# Patient Record
Sex: FEMALE
[De-identification: names, ages, dates, MRNs, and addresses within clinical notes are randomized per-mention and may not be internally consistent; named-entity substitution may affect disease eponyms.]

---

## 2017-11-14 ENCOUNTER — CHARTING TRANS (OUTPATIENT)
Dept: OTHER | Age: 82
End: 2017-11-14

## 2018-02-10 ENCOUNTER — HOSPITAL ENCOUNTER (INPATIENT)
Facility: HOSPITAL | Age: 83
LOS: 3 days | Discharge: NURSING, REHAB OR LONG-TERM CARE | DRG: 554 | End: 2018-02-13
Attending: EMERGENCY MEDICINE | Admitting: HOSPITALIST
Payer: MEDICARE

## 2018-02-10 ENCOUNTER — APPOINTMENT (OUTPATIENT)
Dept: GENERAL RADIOLOGY | Facility: HOSPITAL | Age: 83
DRG: 554 | End: 2018-02-10
Attending: EMERGENCY MEDICINE
Payer: MEDICARE

## 2018-02-10 DIAGNOSIS — M25.00 HEMARTHROSIS: Primary | ICD-10-CM

## 2018-02-10 DIAGNOSIS — L03.113 CELLULITIS OF RIGHT UPPER EXTREMITY: ICD-10-CM

## 2018-02-10 DIAGNOSIS — R79.1 SUPRATHERAPEUTIC INR: ICD-10-CM

## 2018-02-10 LAB
ANION GAP SERPL CALC-SCNC: 8 MMOL/L (ref 0–18)
BASOPHILS # BLD: 0 K/UL (ref 0–0.2)
BASOPHILS NFR BLD: 0 %
BASOPHILS NFR FLD: 0 %
BUN SERPL-MCNC: 23 MG/DL (ref 8–20)
BUN/CREAT SERPL: 24.2 (ref 10–20)
CALCIUM SERPL-MCNC: 9.3 MG/DL (ref 8.5–10.5)
CHLORIDE SERPL-SCNC: 103 MMOL/L (ref 95–110)
CO2 SERPL-SCNC: 23 MMOL/L (ref 22–32)
CREAT SERPL-MCNC: 0.95 MG/DL (ref 0.5–1.5)
CRP SERPL-MCNC: 0.6 MG/DL (ref 0–0.9)
EOSINOPHIL # BLD: 0.1 K/UL (ref 0–0.7)
EOSINOPHIL NFR BLD: 1 %
EOSINOPHIL NFR FLD: 1 %
ERYTHROCYTE [DISTWIDTH] IN BLOOD BY AUTOMATED COUNT: 14.8 % (ref 11–15)
ERYTHROCYTE [SEDIMENTATION RATE] IN BLOOD: 29 MM/HR (ref 0–42)
GLUCOSE SERPL-MCNC: 129 MG/DL (ref 70–99)
HCT VFR BLD AUTO: 36.9 % (ref 35–48)
HGB BLD-MCNC: 12.3 G/DL (ref 12–16)
INR BLD: 3.9 (ref 0.9–1.2)
LYMPHOCYTES # BLD: 0.9 K/UL (ref 1–4)
LYMPHOCYTES NFR BLD: 11 %
LYMPHOCYTES NFR FLD: 6 %
MCH RBC QN AUTO: 31.7 PG (ref 27–32)
MCHC RBC AUTO-ENTMCNC: 33.3 G/DL (ref 32–37)
MCV RBC AUTO: 95 FL (ref 80–100)
MONOCYTES # BLD: 0.7 K/UL (ref 0–1)
MONOCYTES NFR BLD: 9 %
MONOCYTES NFR FLD: 5 %
NEUTROPHILS # BLD AUTO: 6.3 K/UL (ref 1.8–7.7)
NEUTROPHILS NFR BLD: 79 %
NEUTROPHILS NFR FLD: 87 %
OSMOLALITY UR CALC.SUM OF ELEC: 283 MOSM/KG (ref 275–295)
PLATELET # BLD AUTO: 194 K/UL (ref 140–400)
PMV BLD AUTO: 8.1 FL (ref 7.4–10.3)
POTASSIUM SERPL-SCNC: 4.1 MMOL/L (ref 3.3–5.1)
PROTHROMBIN TIME: 37.6 SECONDS (ref 11.8–14.5)
RBC # BLD AUTO: 3.88 M/UL (ref 3.7–5.4)
SODIUM SERPL-SCNC: 134 MMOL/L (ref 136–144)
TSH SERPL-ACNC: 0.49 UIU/ML (ref 0.45–5.33)
WBC # BLD AUTO: 8 K/UL (ref 4–11)
WBC # FLD: 5387 /CUMM
WBC OTHER NFR FLD: 1 %

## 2018-02-10 PROCEDURE — 85652 RBC SED RATE AUTOMATED: CPT | Performed by: EMERGENCY MEDICINE

## 2018-02-10 PROCEDURE — 85610 PROTHROMBIN TIME: CPT | Performed by: EMERGENCY MEDICINE

## 2018-02-10 PROCEDURE — 96366 THER/PROPH/DIAG IV INF ADDON: CPT

## 2018-02-10 PROCEDURE — 96376 TX/PRO/DX INJ SAME DRUG ADON: CPT

## 2018-02-10 PROCEDURE — 73080 X-RAY EXAM OF ELBOW: CPT | Performed by: EMERGENCY MEDICINE

## 2018-02-10 PROCEDURE — 96365 THER/PROPH/DIAG IV INF INIT: CPT

## 2018-02-10 PROCEDURE — 89050 BODY FLUID CELL COUNT: CPT | Performed by: EMERGENCY MEDICINE

## 2018-02-10 PROCEDURE — 89051 BODY FLUID CELL COUNT: CPT | Performed by: EMERGENCY MEDICINE

## 2018-02-10 PROCEDURE — 96375 TX/PRO/DX INJ NEW DRUG ADDON: CPT

## 2018-02-10 PROCEDURE — 99285 EMERGENCY DEPT VISIT HI MDM: CPT

## 2018-02-10 PROCEDURE — 20605 DRAIN/INJ JOINT/BURSA W/O US: CPT

## 2018-02-10 PROCEDURE — 85025 COMPLETE CBC W/AUTO DIFF WBC: CPT | Performed by: EMERGENCY MEDICINE

## 2018-02-10 PROCEDURE — A4216 STERILE WATER/SALINE, 10 ML: HCPCS | Performed by: HOSPITALIST

## 2018-02-10 PROCEDURE — 86140 C-REACTIVE PROTEIN: CPT | Performed by: EMERGENCY MEDICINE

## 2018-02-10 PROCEDURE — 87070 CULTURE OTHR SPECIMN AEROBIC: CPT | Performed by: EMERGENCY MEDICINE

## 2018-02-10 PROCEDURE — 84443 ASSAY THYROID STIM HORMONE: CPT | Performed by: HOSPITALIST

## 2018-02-10 PROCEDURE — 0R9L3ZX DRAINAGE OF RIGHT ELBOW JOINT, PERCUTANEOUS APPROACH, DIAGNOSTIC: ICD-10-PCS | Performed by: EMERGENCY MEDICINE

## 2018-02-10 PROCEDURE — 89060 EXAM SYNOVIAL FLUID CRYSTALS: CPT | Performed by: EMERGENCY MEDICINE

## 2018-02-10 PROCEDURE — 87205 SMEAR GRAM STAIN: CPT | Performed by: EMERGENCY MEDICINE

## 2018-02-10 PROCEDURE — 80048 BASIC METABOLIC PNL TOTAL CA: CPT | Performed by: EMERGENCY MEDICINE

## 2018-02-10 RX ORDER — SODIUM CHLORIDE 0.9 % (FLUSH) 0.9 %
3 SYRINGE (ML) INJECTION AS NEEDED
Status: DISCONTINUED | OUTPATIENT
Start: 2018-02-10 | End: 2018-02-13

## 2018-02-10 RX ORDER — MORPHINE SULFATE 2 MG/ML
2 INJECTION, SOLUTION INTRAMUSCULAR; INTRAVENOUS ONCE
Status: COMPLETED | OUTPATIENT
Start: 2018-02-10 | End: 2018-02-10

## 2018-02-10 RX ORDER — DOCUSATE SODIUM 100 MG/1
100 CAPSULE, LIQUID FILLED ORAL 2 TIMES DAILY
Status: DISCONTINUED | OUTPATIENT
Start: 2018-02-10 | End: 2018-02-13

## 2018-02-10 RX ORDER — LIDOCAINE HYDROCHLORIDE AND EPINEPHRINE 20; 5 MG/ML; UG/ML
20 INJECTION, SOLUTION EPIDURAL; INFILTRATION; INTRACAUDAL; PERINEURAL ONCE
Status: COMPLETED | OUTPATIENT
Start: 2018-02-10 | End: 2018-02-10

## 2018-02-10 RX ORDER — LIDOCAINE HYDROCHLORIDE 10 MG/ML
20 INJECTION, SOLUTION EPIDURAL; INFILTRATION; INTRACAUDAL; PERINEURAL ONCE
Status: DISCONTINUED | OUTPATIENT
Start: 2018-02-10 | End: 2018-02-10

## 2018-02-10 RX ORDER — HYDROCODONE BITARTRATE AND ACETAMINOPHEN 5; 325 MG/1; MG/1
2 TABLET ORAL EVERY 4 HOURS PRN
Status: DISCONTINUED | OUTPATIENT
Start: 2018-02-10 | End: 2018-02-13

## 2018-02-10 RX ORDER — TRAMADOL HYDROCHLORIDE 50 MG/1
50 TABLET ORAL EVERY 6 HOURS PRN
Status: DISCONTINUED | OUTPATIENT
Start: 2018-02-10 | End: 2018-02-13

## 2018-02-10 RX ORDER — SODIUM PHOSPHATE, DIBASIC AND SODIUM PHOSPHATE, MONOBASIC 7; 19 G/133ML; G/133ML
1 ENEMA RECTAL ONCE AS NEEDED
Status: DISCONTINUED | OUTPATIENT
Start: 2018-02-10 | End: 2018-02-13

## 2018-02-10 RX ORDER — ONDANSETRON 2 MG/ML
4 INJECTION INTRAMUSCULAR; INTRAVENOUS EVERY 6 HOURS PRN
Status: DISCONTINUED | OUTPATIENT
Start: 2018-02-10 | End: 2018-02-13

## 2018-02-10 RX ORDER — LEVOTHYROXINE SODIUM 0.15 MG/1
150 TABLET ORAL EVERY OTHER DAY
Status: DISCONTINUED | OUTPATIENT
Start: 2018-02-11 | End: 2018-02-13

## 2018-02-10 RX ORDER — ACETAMINOPHEN 325 MG/1
650 TABLET ORAL EVERY 4 HOURS PRN
Status: DISCONTINUED | OUTPATIENT
Start: 2018-02-10 | End: 2018-02-13

## 2018-02-10 RX ORDER — POLYETHYLENE GLYCOL 3350 17 G/17G
17 POWDER, FOR SOLUTION ORAL DAILY PRN
Status: DISCONTINUED | OUTPATIENT
Start: 2018-02-10 | End: 2018-02-13

## 2018-02-10 RX ORDER — LOSARTAN POTASSIUM 100 MG/1
100 TABLET ORAL DAILY
Status: DISCONTINUED | OUTPATIENT
Start: 2018-02-10 | End: 2018-02-11

## 2018-02-10 RX ORDER — ALENDRONATE SODIUM 70 MG/1
70 TABLET ORAL
Status: DISCONTINUED | OUTPATIENT
Start: 2018-02-11 | End: 2018-02-13

## 2018-02-10 RX ORDER — TRAZODONE HYDROCHLORIDE 100 MG/1
50 TABLET ORAL NIGHTLY PRN
Status: DISCONTINUED | OUTPATIENT
Start: 2018-02-10 | End: 2018-02-13

## 2018-02-10 RX ORDER — LIDOCAINE HYDROCHLORIDE AND EPINEPHRINE 20; 5 MG/ML; UG/ML
INJECTION, SOLUTION EPIDURAL; INFILTRATION; INTRACAUDAL; PERINEURAL
Status: DISPENSED
Start: 2018-02-10 | End: 2018-02-10

## 2018-02-10 RX ORDER — ROSUVASTATIN CALCIUM 20 MG/1
20 TABLET, COATED ORAL NIGHTLY
Status: DISCONTINUED | OUTPATIENT
Start: 2018-02-10 | End: 2018-02-13

## 2018-02-10 RX ORDER — MORPHINE SULFATE 2 MG/ML
INJECTION, SOLUTION INTRAMUSCULAR; INTRAVENOUS
Status: DISPENSED
Start: 2018-02-10 | End: 2018-02-10

## 2018-02-10 RX ORDER — METOPROLOL SUCCINATE 25 MG/1
25 TABLET, EXTENDED RELEASE ORAL 2 TIMES DAILY
Status: DISCONTINUED | OUTPATIENT
Start: 2018-02-10 | End: 2018-02-13

## 2018-02-10 RX ORDER — FUROSEMIDE 20 MG/1
20 TABLET ORAL DAILY
Status: DISCONTINUED | OUTPATIENT
Start: 2018-02-10 | End: 2018-02-12

## 2018-02-10 RX ORDER — BISACODYL 10 MG
10 SUPPOSITORY, RECTAL RECTAL
Status: DISCONTINUED | OUTPATIENT
Start: 2018-02-10 | End: 2018-02-13

## 2018-02-10 RX ORDER — MULTIVITAMIN/IRON/FOLIC ACID 18MG-0.4MG
100 TABLET ORAL DAILY
Status: DISCONTINUED | OUTPATIENT
Start: 2018-02-10 | End: 2018-02-13

## 2018-02-10 RX ORDER — AMLODIPINE BESYLATE 5 MG/1
5 TABLET ORAL DAILY
Status: DISCONTINUED | OUTPATIENT
Start: 2018-02-10 | End: 2018-02-13

## 2018-02-10 RX ORDER — LEVOTHYROXINE SODIUM 137 UG/1
137 TABLET ORAL EVERY OTHER DAY
Status: DISCONTINUED | OUTPATIENT
Start: 2018-02-12 | End: 2018-02-13

## 2018-02-10 RX ORDER — HYDROCODONE BITARTRATE AND ACETAMINOPHEN 5; 325 MG/1; MG/1
1 TABLET ORAL EVERY 4 HOURS PRN
Status: DISCONTINUED | OUTPATIENT
Start: 2018-02-10 | End: 2018-02-13

## 2018-02-10 NOTE — H&P
[address]    History & Physical (or consult)    Judy Half Patient Status:  Inpatient    1925 MRN [id number]   Location [facility] 3W/SW Attending Henny Pantoja MD   Hosp Day # 0 PCP ROE MOTLEY [facility] removed also  Family History   Problem Relation Age of Onset   • Heart Disorder Father    • Other [OTHER] Father      Kidney disease   • Heart Disorder Mother    • Diabetes Mother    • Hypertension Mother    • Heart Disorder Sister    • Cancer Brother Take 50 mg by mouth every 6 (six) hours as needed for Pain. Takes rarely   RESVERATROL OR Take  by mouth daily. Magnesium 100 MG Oral Cap Take 100 mg by mouth daily.      Calcium Carbonate-Vitamin D (OSCAL 500/200 D-3) 500-200 MG-UNIT Oral Tab Take 1 Tab 02/10/18   0650   RBC  3.88   HGB  12.3   HCT  36.9   MCV  95.0   MCH  31.7   MCHC  33.3   RDW  14.8   WBC  8.0   PLT  194       Recent Labs   Lab  02/10/18   0650   GLU  129*   BUN  23*   CREATSERUM  0.95   GFRAA  60   GFRNAA  52*   CA  9.3   NA  134*   K Keshia Uriarte MD  2/10/2018

## 2018-02-10 NOTE — ED PROVIDER NOTES
Patient Seen in: [facility] AND [facility] Emergency Department    History   Patient presents with:  Upper Extremity Injury (musculoskeletal)    Stated Complaint: r elbow pain can't remember injury    HPI    45-year-old female on Coumadin with history of A. fib (5' 4\")   Wt 70.3 kg   LMP 01/01/1987   SpO2 95%   BMI 26.61 kg/m²         Physical Exam    Constitutional: Oriented to person, place, and time. Appears well-developed. No distress. Head: Normocephalic and atraumatic.    Eyes: Conjunctivae are normal. P ------                     CBC W/ DIFFERENTIAL[[id number]]          Abnormal            Final result                 Please view results for these tests on the individual orders. CELL CT/DIF & CRYSTAL SYNOVIAL    Narrative:      The following orders were c No evidence of acute fracture or dislocation. No evidence of erosions. 2. Chronic fracture deformity of the proximal right radius. 3. Severe DJD of the right elbow.          MDM     Clinically after initially seeing the patient I did a quite a bit of concer

## 2018-02-10 NOTE — CM/SW NOTE
Call rc'd from pt's RN that pt is concerned her insurance is out of network. Explained to pt & son she is in-network and is an inpatient admission and this stay with be billed to Medicare Part A.  Pt & son v/u.

## 2018-02-10 NOTE — CONSULTS
NURSING INTAKE COMMENTS: Patient presents with:  Upper Extremity Injury (musculoskeletal)      HPI: This 80year old female presents today with complaints of right elbow pain. Patient denies any specific trauma.   She is on Coumadin and her labs, she is segovia Levothyroxine Sodium (SYNTHROID) 150 MCG Oral Tab Take 1 tablet (150 mcg total) by mouth every other day. Disp: 100 tablet Rfl: 1   Levothyroxine Sodium (SYNTHROID) 137 MCG Oral Tab Take 137 mcg by mouth every other day.  Disp: 100 tablet Rfl: 1   Theresa Gregg Years of education: N/A  Number of children: N/A     Occupational History  None on file     Social History Main Topics   Smoking status: Never Smoker    Smokeless tobacco: Never Used    Alcohol use No    Drug use: No    Sexual activity: Not on file     Oth

## 2018-02-10 NOTE — ED NOTES
Pt reports to ED with complaints of RUE pain that started last night. Pt denies any trauma or injury, no deformity. Pt tearful, reports that pain is worst in her R elbow but there is tenderness above and below the elbow.  Pt took tramadol at 2100 last night

## 2018-02-10 NOTE — PROGRESS NOTES
[facility] Pharmacy Note:  Renal Adjustment for cefazolin (ANCEF)    Nunu Lockett is a 80year old female who has been prescribed cefazolin (ANCEF) 1000 mg every 8 hrs. CrCl is estimated creatinine clearance is 32.6 mL/min (based on SCr of 0.95 mg/dL).  so the

## 2018-02-11 LAB
ANION GAP SERPL CALC-SCNC: 6 MMOL/L (ref 0–18)
BASOPHILS # BLD: 0.1 K/UL (ref 0–0.2)
BASOPHILS NFR BLD: 1 %
BUN SERPL-MCNC: 23 MG/DL (ref 8–20)
BUN/CREAT SERPL: 20 (ref 10–20)
CALCIUM SERPL-MCNC: 8.3 MG/DL (ref 8.5–10.5)
CHLORIDE SERPL-SCNC: 104 MMOL/L (ref 95–110)
CO2 SERPL-SCNC: 22 MMOL/L (ref 22–32)
CREAT SERPL-MCNC: 1.15 MG/DL (ref 0.5–1.5)
EOSINOPHIL # BLD: 0.1 K/UL (ref 0–0.7)
EOSINOPHIL NFR BLD: 2 %
ERYTHROCYTE [DISTWIDTH] IN BLOOD BY AUTOMATED COUNT: 14.7 % (ref 11–15)
GLUCOSE SERPL-MCNC: 110 MG/DL (ref 70–99)
HCT VFR BLD AUTO: 31.3 % (ref 35–48)
HGB BLD-MCNC: 10.6 G/DL (ref 12–16)
INR BLD: 2.3 (ref 0.9–1.2)
LYMPHOCYTES # BLD: 1.1 K/UL (ref 1–4)
LYMPHOCYTES NFR BLD: 16 %
MCH RBC QN AUTO: 32 PG (ref 27–32)
MCHC RBC AUTO-ENTMCNC: 33.8 G/DL (ref 32–37)
MCV RBC AUTO: 94.8 FL (ref 80–100)
MONOCYTES # BLD: 1 K/UL (ref 0–1)
MONOCYTES NFR BLD: 15 %
NEUTROPHILS # BLD AUTO: 4.4 K/UL (ref 1.8–7.7)
NEUTROPHILS NFR BLD: 66 %
OSMOLALITY UR CALC.SUM OF ELEC: 278 MOSM/KG (ref 275–295)
PLATELET # BLD AUTO: 148 K/UL (ref 140–400)
PMV BLD AUTO: 8 FL (ref 7.4–10.3)
POTASSIUM SERPL-SCNC: 4.1 MMOL/L (ref 3.3–5.1)
PROTHROMBIN TIME: 25.1 SECONDS (ref 11.8–14.5)
RBC # BLD AUTO: 3.3 M/UL (ref 3.7–5.4)
SODIUM SERPL-SCNC: 132 MMOL/L (ref 136–144)
WBC # BLD AUTO: 6.7 K/UL (ref 4–11)

## 2018-02-11 PROCEDURE — 85025 COMPLETE CBC W/AUTO DIFF WBC: CPT | Performed by: HOSPITALIST

## 2018-02-11 PROCEDURE — 85610 PROTHROMBIN TIME: CPT | Performed by: HOSPITALIST

## 2018-02-11 PROCEDURE — 80048 BASIC METABOLIC PNL TOTAL CA: CPT | Performed by: HOSPITALIST

## 2018-02-11 RX ORDER — HYDRALAZINE HYDROCHLORIDE 25 MG/1
25 TABLET, FILM COATED ORAL EVERY 8 HOURS PRN
Status: DISCONTINUED | OUTPATIENT
Start: 2018-02-11 | End: 2018-02-13

## 2018-02-11 RX ORDER — KETOROLAC TROMETHAMINE 30 MG/ML
15 INJECTION, SOLUTION INTRAMUSCULAR; INTRAVENOUS EVERY 6 HOURS
Status: DISPENSED | OUTPATIENT
Start: 2018-02-11 | End: 2018-02-13

## 2018-02-11 NOTE — PROGRESS NOTES
[facility]    Progress Note    Robertojossie Garza Patient Status:  Inpatient    1925 MRN [id number]   Location [facility] 3W/SW Attending Good Castaneda MD   Hosp Day # 1 PCP Sonia Singleton MD       Subjective:   Jessica Aguilera Severe DJD of the right elbow.                  GA Meyer  2/11/2018

## 2018-02-11 NOTE — PROGRESS NOTES
[address]    Progress Note    Kamilah Bad Patient Status:  Inpatient    1925 MRN [id number]   Location [facility] 3W/SW Attending Hugo Ordonez MD   Hosp Day # 1 PCP Ulysses Led, MD source Oral, resp.  rate 20, height 162.6 cm (5' 4\"), weight 162 lb 6.4 oz (73.7 kg), last menstrual period 01/01/1987, SpO2 94 %.    gen- NAD   heent- moist mucus membranes, atraumatic  Lungs-  clear bilaterally, normal respiratory effort  cv-  rrr,  no m tab 20 mg 20 mg Oral Nightly   TraMADol HCl (ULTRAM) tab 50 mg 50 mg Oral Q6H PRN   losartan (COZAAR) tab 100 mg 100 mg Oral Daily   ondansetron HCl (ZOFRAN) injection 4 mg 4 mg Intravenous Q6H PRN   ceFAZolin (ANCEF) IVPB 1g/100ml in 0.9% NaCl minibag/add

## 2018-02-12 LAB
ANION GAP SERPL CALC-SCNC: 8 MMOL/L (ref 0–18)
BUN SERPL-MCNC: 25 MG/DL (ref 8–20)
BUN/CREAT SERPL: 20.8 (ref 10–20)
CALCIUM SERPL-MCNC: 8.3 MG/DL (ref 8.5–10.5)
CHLORIDE SERPL-SCNC: 100 MMOL/L (ref 95–110)
CO2 SERPL-SCNC: 22 MMOL/L (ref 22–32)
CREAT SERPL-MCNC: 1.2 MG/DL (ref 0.5–1.5)
GLUCOSE SERPL-MCNC: 102 MG/DL (ref 70–99)
INR BLD: 1.6 (ref 0.9–1.2)
OSMOLALITY UR CALC.SUM OF ELEC: 275 MOSM/KG (ref 275–295)
POTASSIUM SERPL-SCNC: 4.1 MMOL/L (ref 3.3–5.1)
PROTHROMBIN TIME: 19 SECONDS (ref 11.8–14.5)
SODIUM SERPL-SCNC: 130 MMOL/L (ref 136–144)

## 2018-02-12 PROCEDURE — 97530 THERAPEUTIC ACTIVITIES: CPT

## 2018-02-12 PROCEDURE — 85610 PROTHROMBIN TIME: CPT | Performed by: HOSPITALIST

## 2018-02-12 PROCEDURE — 97162 PT EVAL MOD COMPLEX 30 MIN: CPT

## 2018-02-12 PROCEDURE — A4216 STERILE WATER/SALINE, 10 ML: HCPCS | Performed by: HOSPITALIST

## 2018-02-12 PROCEDURE — 80048 BASIC METABOLIC PNL TOTAL CA: CPT | Performed by: HOSPITALIST

## 2018-02-12 PROCEDURE — 97166 OT EVAL MOD COMPLEX 45 MIN: CPT

## 2018-02-12 RX ORDER — SODIUM CHLORIDE 9 MG/ML
INJECTION, SOLUTION INTRAVENOUS
Status: COMPLETED
Start: 2018-02-12 | End: 2018-02-12

## 2018-02-12 RX ORDER — WARFARIN SODIUM 2 MG/1
2 TABLET ORAL NIGHTLY
Status: DISCONTINUED | OUTPATIENT
Start: 2018-02-12 | End: 2018-02-13

## 2018-02-12 NOTE — OCCUPATIONAL THERAPY NOTE
OCCUPATIONAL THERAPY EVALUATION - INPATIENT     Room Number: 306/306-A  Evaluation Date: 2/12/2018  Type of Evaluation: Initial  Presenting Problem: supratherapeutic INR, RUE cellulitis    Physician Order: IP Consult to Occupational Therapy  Reason for The above deficits, maximizing patient's ability to return to prior level of function. Upon D/C, patient would benefit from continued OT in a rehab setting to return to PLOF and living alone at home safely.      DISCHARGE RECOMMENDATIONS  OT Discharge Recommend RW    Prior Level of Collin: Patient reports being mod I w/ ADLs, IADLs, including medication management and light cooking, functional mobility, and driving. Patient's son lives nearby and checks in on patient everyday.  Patient states that her family Modifier (G-Code): CK    FUNCTIONAL TRANSFER ASSESSMENT  Supine to Sit : Supervision  Sit to Stand: Minimum assistance  Toilet Transfer: min A  Shower Transfer: NT  Chair Transfer: min A x2    Bedroom Mobility: RW and min A/CGA    BALANCE ASSESSMENT  Stati

## 2018-02-12 NOTE — CM/SW NOTE
2/12/18 CM Discharge planning   Son in agreement with short term rehab, requested rehab at [facility] (sub acute), [facility] or [facility].    Referral and medical records faxed to above rehabs via [employer], also requested NH DON from [facility]

## 2018-02-12 NOTE — PHYSICAL THERAPY NOTE
PHYSICAL THERAPY EVALUATION - INPATIENT     Room Number: 306/306-A  Evaluation Date: 2/12/2018  Type of Evaluation: Initial   Physician Order: PT Eval and Treat    Presenting Problem: p/w R elbow pain, dx with hemarthrosis; negative for fx  Reason for The training;Transfer training;Balance training  Rehab Potential : Good  Frequency (Obs): 5x/week       PHYSICAL THERAPY MEDICAL/SOCIAL HISTORY     Problem List  Principal Problem:    Hemarthrosis  Active Problems:    Supratherapeutic INR    Cellulitis of righ mechanics;Repositioning    COGNITION  · Overall Cognitive Status:  WFL - within functional limits    RANGE OF MOTION AND STRENGTH ASSESSMENT    Lower extremity AROM is within functional limits    Lower extremity strength is within functional limits except within reach;RN aware of session/findings; All patient questions and concerns addressed    CURRENT GOALS    Goals to be met by: 2/26/18   Patient Goal Patient's self-stated goal is: Go to rehab and get stronger   Goal #1 Patient is able to demonstrate supin

## 2018-02-12 NOTE — PROGRESS NOTES
[facility] Hospitalist Team  Progress Note    Shyam Villavicencio Patient Status:  Inpatient    1925 MRN [id number]   Location [facility] 3W/SW Attending Ramona Ellis MD   Hosp Day # 2 PCP Carol Manriquez MD     CC: Follow Up  PCP: Wilfrido Simental am  -IVF,none  -diet-general    Prophy  -SCD  -warfarin    Dispo  -anticipate D/C to rehab tomorrow once arrangements made  Spoke to son Cornell Grijalva, [phone], wants pt to go to subacute [facility] if able, unclear on secondary options, he will be in later today to Medications:  ketorolac tromethamine (TORADOL) 30 MG/ML injection 15 mg 15 mg Intravenous Q6H   hydrALAzine HCl (APRESOLINE) tab 25 mg 25 mg Oral Q8H PRN   Normal Saline Flush 0.9 % injection 3 mL 3 mL Intravenous PRN   acetaminophen (TYLENOL) tab 650 mg 6 Still cannot use her right arm much.   She lives alone      o-  gen-nad,   Lungs-clear, no wheezing  cv-rrr no edema  abd-soft, nontender, nondistended, + bowel sounds  neuropsych - alert and oriented, mental status at baseline   msk- right elbow with brui

## 2018-02-12 NOTE — PROGRESS NOTES
Patient feeling significantly better with decreased sharp pain. On examination she still has pain with passive and active range of motion but much less so than when I last saw her. There still is a fluid collection/hematoma. No signs of infection.   No

## 2018-02-13 VITALS
HEIGHT: 64 IN | RESPIRATION RATE: 16 BRPM | SYSTOLIC BLOOD PRESSURE: 127 MMHG | WEIGHT: 165.13 LBS | DIASTOLIC BLOOD PRESSURE: 82 MMHG | BODY MASS INDEX: 28.19 KG/M2 | OXYGEN SATURATION: 96 % | HEART RATE: 75 BPM | TEMPERATURE: 98 F

## 2018-02-13 PROBLEM — L03.113 CELLULITIS OF RIGHT UPPER EXTREMITY: Status: RESOLVED | Noted: 2018-02-10 | Resolved: 2018-02-13

## 2018-02-13 PROBLEM — R79.1 SUPRATHERAPEUTIC INR: Status: RESOLVED | Noted: 2018-02-10 | Resolved: 2018-02-13

## 2018-02-13 LAB
ANION GAP SERPL CALC-SCNC: 6 MMOL/L (ref 0–18)
BUN SERPL-MCNC: 26 MG/DL (ref 8–20)
BUN/CREAT SERPL: 23.4 (ref 10–20)
CALCIUM SERPL-MCNC: 8.1 MG/DL (ref 8.5–10.5)
CHLORIDE SERPL-SCNC: 103 MMOL/L (ref 95–110)
CO2 SERPL-SCNC: 21 MMOL/L (ref 22–32)
CREAT SERPL-MCNC: 1.11 MG/DL (ref 0.5–1.5)
GLUCOSE SERPL-MCNC: 99 MG/DL (ref 70–99)
INR BLD: 1.5 (ref 0.9–1.2)
OSMOLALITY UR CALC.SUM OF ELEC: 275 MOSM/KG (ref 275–295)
POTASSIUM SERPL-SCNC: 4.3 MMOL/L (ref 3.3–5.1)
PROTHROMBIN TIME: 17.6 SECONDS (ref 11.8–14.5)
SODIUM SERPL-SCNC: 130 MMOL/L (ref 136–144)

## 2018-02-13 PROCEDURE — 97116 GAIT TRAINING THERAPY: CPT

## 2018-02-13 PROCEDURE — 80048 BASIC METABOLIC PNL TOTAL CA: CPT | Performed by: NURSE PRACTITIONER

## 2018-02-13 PROCEDURE — 85610 PROTHROMBIN TIME: CPT | Performed by: NURSE PRACTITIONER

## 2018-02-13 PROCEDURE — 97530 THERAPEUTIC ACTIVITIES: CPT

## 2018-02-13 RX ORDER — PSEUDOEPHEDRINE HCL 30 MG
100 TABLET ORAL 2 TIMES DAILY
Qty: 60 CAPSULE | Refills: 0 | Status: SHIPPED | OUTPATIENT
Start: 2018-02-13 | End: 2018-08-02 | Stop reason: ALTCHOICE

## 2018-02-13 RX ORDER — TRAMADOL HYDROCHLORIDE 50 MG/1
50 TABLET ORAL EVERY 12 HOURS PRN
Status: DISCONTINUED | OUTPATIENT
Start: 2018-02-13 | End: 2018-02-13

## 2018-02-13 RX ORDER — TRAMADOL HYDROCHLORIDE 50 MG/1
50 TABLET ORAL EVERY 6 HOURS PRN
Qty: 10 TABLET | Refills: 0 | Status: ON HOLD | OUTPATIENT
Start: 2018-02-13 | End: 2021-01-04

## 2018-02-13 RX ORDER — FUROSEMIDE 20 MG/1
20 TABLET ORAL DAILY
Qty: 100 TABLET | Refills: 1 | Status: SHIPPED | OUTPATIENT
Start: 2018-02-13 | End: 2018-08-02

## 2018-02-13 RX ORDER — VALSARTAN 160 MG/1
TABLET ORAL
Qty: 30 TABLET | Refills: 0 | Status: SHIPPED | OUTPATIENT
Start: 2018-02-13 | End: 2018-08-02

## 2018-02-13 RX ORDER — HYDROCODONE BITARTRATE AND ACETAMINOPHEN 5; 325 MG/1; MG/1
TABLET ORAL
Qty: 20 TABLET | Refills: 0 | Status: SHIPPED | OUTPATIENT
Start: 2018-02-13 | End: 2019-02-12

## 2018-02-13 RX ORDER — WARFARIN SODIUM 2 MG/1
2 TABLET ORAL NIGHTLY
Qty: 30 TABLET | Refills: 0 | Status: SHIPPED | OUTPATIENT
Start: 2018-02-13 | End: 2018-08-03

## 2018-02-13 RX ORDER — FUROSEMIDE 20 MG/1
20 TABLET ORAL
Qty: 100 TABLET | Refills: 1 | Status: SHIPPED | OUTPATIENT
Start: 2018-02-13 | End: 2018-02-13

## 2018-02-13 RX ORDER — POLYETHYLENE GLYCOL 3350 17 G/17G
17 POWDER, FOR SOLUTION ORAL DAILY PRN
Qty: 7 EACH | Refills: 0 | Status: SHIPPED | OUTPATIENT
Start: 2018-02-13 | End: 2018-08-02 | Stop reason: ALTCHOICE

## 2018-02-13 NOTE — PROGRESS NOTES
[facility] Pharmacy Note:  Renal Dose Adjustment for Tramadol Malathi Vergara)    Neela Dao has been prescribed Tramadol (ULTRAM) 50 mg orally every 6 hours as needed for pain. Estimated Creatinine Clearance: 27.9 mL/min (based on SCr of 1.11 mg/dL).     Her calcu

## 2018-02-13 NOTE — DISCHARGE SUMMARY
[facility] Hospitalist Discharge Summary   Patient ID:  Hannah Syed  [id number]  17 year old  12/30/1925    Admit date: 2/10/2018  Discharge date: 2/13/2018    Primary Care Physician: Latisha Steiner MD   Attending Physician: Carri Villagomez MD   [facility] Erythema. Pt found to have hemarthrosis right elbow in setting of admission INR 3.9. Will need tighter control of INR upon D/C.  Pt D/C to rehab, see below for details     Hemarthrosis, Right Elbow  -admit INR 3.9  -Xray right elbow- no evidence of fx  -S/P medications    docusate sodium 100 MG Caps  Commonly known as:  COLACE  Take 100 mg by mouth 2 (two) times daily.      HYDROcodone-acetaminophen 5-325 MG Tabs  Commonly known as:  NORCO  1-2 tabs every 4-6 hours as needed     magnesium hydroxide 400 MG/5ML TOPROL XL  Take 1 tablet (25 mg total) by mouth 2 (two) times daily.      MULTIVITAMIN OR     OSCAL 500/200 D-3 500-200 MG-UNIT Tabs  Generic drug:  Calcium Carbonate-Vitamin D     RESVERATROL OR     TraMADol HCl 50 MG Tabs  Commonly known as:  ULTRAM  Take agree with therapeutic plan as outlined    Hawk Kraus RN, NP   [facility] Hospitalist Team  Pager [phone]  Answering Service number: [phone]  2/13/2018      SEE ATTENDING NOTE BELOW        Patient seen and examined independently.   Discussed with GA singh

## 2018-02-13 NOTE — PHYSICAL THERAPY NOTE
PHYSICAL THERAPY TREATMENT NOTE - INPATIENT    Room Number: 347/231-D       Presenting Problem: p/w R elbow pain, dx with hemarthrosis; negative for fx    Problem List  Principal Problem:    Hemarthrosis      ASSESSMENT   RN approved session, reports plan have...  -   Turning over in bed (including adjusting bedclothes, sheets and blankets)?: A Little   -   Sitting down on and standing up from a chair with arms (e.g., wheelchair, bedside commode, etc.): A Little   -   Moving from lying on back to sitting on down. Better able to wb through RUE today per patient report. Goal #4 Patient will negotiate 2 stairs/one curb w/ assistive device and supervision   Goal #4   Current Status NT- going to rehab so will not stair train at this time.     Goal #5 Patient to

## 2018-02-13 NOTE — CONSULTS
Called [facility] for report, awaiting son to tranport patient, rx sent. Instructions given.

## 2018-02-13 NOTE — CM/SW NOTE
2/13/18  Discharge planning   Spoke with son Portillo Diaz via phone, advised that [facility] has accepted pt and bed available today at 4:30 pm, room 311 - A, RN report [phone]. Son agreed to above rehab transfer and will transport pt ot rehab.

## 2018-11-02 VITALS — TEMPERATURE: 97.8 F

## 2020-01-13 ENCOUNTER — OFFICE VISIT (OUTPATIENT)
Dept: SURGERY | Facility: CLINIC | Age: 85
End: 2020-01-13
Payer: COMMERCIAL

## 2020-01-13 ENCOUNTER — TELEPHONE (OUTPATIENT)
Dept: SURGERY | Facility: CLINIC | Age: 85
End: 2020-01-13

## 2020-01-13 VITALS
TEMPERATURE: 98 F | HEART RATE: 88 BPM | SYSTOLIC BLOOD PRESSURE: 159 MMHG | WEIGHT: 165 LBS | DIASTOLIC BLOOD PRESSURE: 76 MMHG | BODY MASS INDEX: 28.17 KG/M2 | HEIGHT: 64 IN

## 2020-01-13 DIAGNOSIS — N13.5 UPJ OBSTRUCTION, ACQUIRED: Primary | ICD-10-CM

## 2020-01-13 PROCEDURE — 99203 OFFICE O/P NEW LOW 30 MIN: CPT | Performed by: UROLOGY

## 2020-01-13 PROCEDURE — G0463 HOSPITAL OUTPT CLINIC VISIT: HCPCS | Performed by: UROLOGY

## 2020-01-13 NOTE — TELEPHONE ENCOUNTER
Received fax confirmation. Waiting for records to be faxed. Received op note from 12/24/2018 Dr. Andreia Blount. Matias at [employer]. One copy placed on Dr Macrina Givens desk, other copy sent to scanning.

## 2020-01-13 NOTE — PROGRESS NOTES
SUBJECTIVE:  Tiff Willams is a 80year old female who presents for a consultation at the request of, and a copy of this note will be sent to, Dr. 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 She states that the problem is unchanged.  Sympto Relation Age of Onset   • Heart Disorder Father    • Other (Other) Father         Kidney disease   • Heart Disorder Mother    • Diabetes Mother    • Hypertension Mother    • Heart Disorder Sister    • Cancer Brother         colon   • Cancer Brother If she develops pain, worsening renal function or episodes of UTI, can replace the stent at that time. She is here with her son who provides some of her history.         Received some records from [facility].  Operative note dated Decem

## 2020-01-13 NOTE — TELEPHONE ENCOUNTER
Per Dr. Deidra Spear, faxed JANIA form to Dr. Alma Patricia at [facility] UBD#[phone]. Waiting for fax confirmation. Placed in fax confirmation folder.

## 2020-03-02 ENCOUNTER — TELEPHONE (OUTPATIENT)
Dept: SURGERY | Facility: CLINIC | Age: 85
End: 2020-03-02

## 2020-03-02 NOTE — TELEPHONE ENCOUNTER
Patient scheduled for in office cystoscopy and stent removal on 3/10. Patient reports she has had bilateral shoulder, hip and knee replacements and usually takes abx prior to dental work. She is also on coumadin.   Please advise if coumadin needs to be he

## 2020-03-03 NOTE — TELEPHONE ENCOUNTER
Nurse may given her Cipro 500 mg when she gets to the office rather than after. This should suffice. She can continue on coumadin as well.   Thanks

## 2020-03-03 NOTE — TELEPHONE ENCOUNTER
Patient now in assisted living and no longer under my care. I believe she is being followed by a physician who goes to the facility.

## 2020-03-04 NOTE — TELEPHONE ENCOUNTER
Instructions below reviewed and patient notified with understanding. Appointment notes updated to reflect patient to receive cipro pre procedure.

## 2020-03-06 ENCOUNTER — TELEPHONE (OUTPATIENT)
Dept: SURGERY | Facility: CLINIC | Age: 85
End: 2020-03-06

## 2020-03-06 NOTE — TELEPHONE ENCOUNTER
I called pt's [facility] ins plan and s/w rep. Jack Grier and gave ins info md info codes and clinical info and she informed that PA was not required for this in office procedure.

## 2020-03-06 NOTE — TELEPHONE ENCOUNTER
[facility] from [facility] advantage plan calling states pt may need PA for the procedure on 3/10 @ 3pm . Also asked for the CPT code as well please advise

## 2020-03-10 ENCOUNTER — TELEPHONE (OUTPATIENT)
Dept: SURGERY | Facility: CLINIC | Age: 85
End: 2020-03-10

## 2020-03-10 ENCOUNTER — PROCEDURE (OUTPATIENT)
Dept: SURGERY | Facility: CLINIC | Age: 85
End: 2020-03-10
Payer: COMMERCIAL

## 2020-03-10 VITALS
BODY MASS INDEX: 28 KG/M2 | SYSTOLIC BLOOD PRESSURE: 138 MMHG | HEART RATE: 82 BPM | WEIGHT: 165 LBS | DIASTOLIC BLOOD PRESSURE: 80 MMHG

## 2020-03-10 DIAGNOSIS — N13.5 OBSTRUCTION OF RIGHT URETEROPELVIC JUNCTION (UPJ): Primary | ICD-10-CM

## 2020-03-10 PROCEDURE — 52000 CYSTOURETHROSCOPY: CPT | Performed by: UROLOGY

## 2020-03-10 PROCEDURE — 3079F DIAST BP 80-89 MM HG: CPT | Performed by: UROLOGY

## 2020-03-10 PROCEDURE — 3075F SYST BP GE 130 - 139MM HG: CPT | Performed by: UROLOGY

## 2020-03-10 RX ORDER — CIPROFLOXACIN 500 MG/1
500 TABLET, FILM COATED ORAL ONCE
Status: COMPLETED | OUTPATIENT
Start: 2020-03-10 | End: 2020-03-10

## 2020-03-10 RX ADMIN — CIPROFLOXACIN 500 MG: 500 TABLET, FILM COATED ORAL at 16:36:00

## 2020-03-10 NOTE — TELEPHONE ENCOUNTER
moni from [facility] [place] called. Pt is scheduled today 3-10-20 at 3:00pm for cysto. Pt is taking coumadin. Call transferred to the nurse.

## 2020-03-10 NOTE — PROGRESS NOTES
Stefania Last is a 80year old female.     HPI:   Patient presents with:  Kidney Problem: patient presents for cysto,right stent removal       80-year-old female presents for cystoscopy and right ureteral stent removal.  She has a chronic history of right Mother    • Heart Disorder Sister    • Cancer Brother         colon   • Cancer Brother         Leukemia   • Heart Disorder Brother       Social History: Social History    Tobacco Use      Smoking status: Never Smoker      Smokeless tobacco: Never Used    A Multiple Vitamins-Minerals (MULTIVITAMIN OR) Take 1 tablet by mouth daily. Allergies:  No Known Allergies      ROS:       PHYSICAL EXAM:   Neela Dao  : 1925  Referring Physician: No ref.  provider found     Patient presents with:  Lissa Monaco

## 2020-03-31 ENCOUNTER — TELEPHONE (OUTPATIENT)
Dept: SURGERY | Facility: CLINIC | Age: 85
End: 2020-03-31

## 2020-03-31 NOTE — TELEPHONE ENCOUNTER
If pain is mild to moderate is probably okay just to manage the pain for the time being.   If it is possible to get the kidney ultrasound in the facility that would be reasonable although it would also be okay to wait for 3 to 4 weeks until the current patel

## 2020-03-31 NOTE — TELEPHONE ENCOUNTER
S/W pt and informed her of ELIU's response msg as stated below and pt verbalized understanding but stated that she is in a nursing home and cant go out and does not know if the US can be done there.  I told pt that I will call the home and s/w the nurse madhav

## 2020-03-31 NOTE — TELEPHONE ENCOUNTER
Pt called stating pt had cysto and stent removal on 3-10-20. Pt is having right kidney pain.   Call pt to advise

## 2020-03-31 NOTE — TELEPHONE ENCOUNTER
Patient c/o right flank pain, describes it as dull, achy, moving makes it worse; onset 2 days ago. Patient states she is urinating \"ok\". Denies fever, chills. Patient states hx of right kidney pain, managed with a stent.  Patient states ureteral emanuel

## 2020-04-02 ENCOUNTER — TELEPHONE (OUTPATIENT)
Dept: SURGERY | Facility: CLINIC | Age: 85
End: 2020-04-02

## 2020-04-02 NOTE — TELEPHONE ENCOUNTER
Received FAX from [employer]. FOR ultrasound report and order signed by MD. Kendy Sharp on MD's desk to review and sign.

## 2020-04-07 NOTE — TELEPHONE ENCOUNTER
Call pt. Inform her I have reviewed her renal US.   Unfortunately the right cannot be visualized well, likely a technical issue related to way US was done and also that her right kidney is likely atrophic  Find out if she is still having right sided flank

## 2020-04-08 NOTE — TELEPHONE ENCOUNTER
S/W pt's son and informed him of the results and orders in [facility] as stated below and he stated that he thinks her pain is better but not positive. I told him that I will try reaching pt again to make sure and let KHVINNY know.

## 2020-04-08 NOTE — TELEPHONE ENCOUNTER
I called pt again and determined that she does have what she calls moderate pain at level 5-6 now in her Rt flank area. Denies any UTI symptoms or fevr and chills.  I told pt that I will let ELIU know this and I informed her of his instructions as stated bel

## 2020-04-09 NOTE — TELEPHONE ENCOUNTER
Received some ua and c&s results from [facility] lab that were ordered by a Dr. Madelin Holder. The culture shows only contaminant bacteria so I will send them to scanning.

## 2020-12-27 ENCOUNTER — HOSPITAL ENCOUNTER (INPATIENT)
Facility: HOSPITAL | Age: 85
LOS: 9 days | Discharge: NURSING, REHAB OR LONG-TERM CARE | DRG: 603 | End: 2021-01-05
Attending: EMERGENCY MEDICINE | Admitting: EMERGENCY MEDICINE
Payer: MEDICARE

## 2020-12-27 DIAGNOSIS — L03.116 CELLULITIS AND ABSCESS OF LEFT LEG: Primary | ICD-10-CM

## 2020-12-27 DIAGNOSIS — L02.416 CELLULITIS AND ABSCESS OF LEFT LEG: Primary | ICD-10-CM

## 2020-12-27 PROBLEM — R79.89 AZOTEMIA: Status: ACTIVE | Noted: 2020-12-27

## 2020-12-27 PROBLEM — E87.1 HYPONATREMIA: Status: ACTIVE | Noted: 2020-12-27

## 2020-12-27 PROBLEM — D64.9 ANEMIA: Status: ACTIVE | Noted: 2020-12-27

## 2020-12-27 LAB
ANION GAP SERPL CALC-SCNC: 6 MMOL/L (ref 0–18)
APTT PPP: 84 SECONDS (ref 23.2–35.3)
BASOPHILS # BLD AUTO: 0.04 X10(3) UL (ref 0–0.2)
BASOPHILS NFR BLD AUTO: 0.4 %
BUN BLD-MCNC: 29 MG/DL (ref 7–18)
BUN/CREAT SERPL: 24.6 (ref 10–20)
CALCIUM BLD-MCNC: 9.3 MG/DL (ref 8.5–10.1)
CHLORIDE SERPL-SCNC: 97 MMOL/L (ref 98–112)
CO2 SERPL-SCNC: 28 MMOL/L (ref 21–32)
CREAT BLD-MCNC: 1.18 MG/DL
DEPRECATED RDW RBC AUTO: 55.8 FL (ref 35.1–46.3)
EOSINOPHIL # BLD AUTO: 0.22 X10(3) UL (ref 0–0.7)
EOSINOPHIL NFR BLD AUTO: 2 %
ERYTHROCYTE [DISTWIDTH] IN BLOOD BY AUTOMATED COUNT: 18.3 % (ref 11–15)
GLUCOSE BLD-MCNC: 98 MG/DL (ref 70–99)
HCT VFR BLD AUTO: 30.9 %
HGB BLD-MCNC: 10 G/DL
IMM GRANULOCYTES # BLD AUTO: 0.05 X10(3) UL (ref 0–1)
IMM GRANULOCYTES NFR BLD: 0.4 %
INR BLD: 3.78 (ref 0.9–1.2)
LACTATE SERPL-SCNC: 1.1 MMOL/L (ref 0.4–2)
LYMPHOCYTES # BLD AUTO: 0.71 X10(3) UL (ref 1–4)
LYMPHOCYTES NFR BLD AUTO: 6.3 %
MCH RBC QN AUTO: 27.4 PG (ref 26–34)
MCHC RBC AUTO-ENTMCNC: 32.4 G/DL (ref 31–37)
MCV RBC AUTO: 84.7 FL
MONOCYTES # BLD AUTO: 0.79 X10(3) UL (ref 0.1–1)
MONOCYTES NFR BLD AUTO: 7 %
NEUTROPHILS # BLD AUTO: 9.4 X10 (3) UL (ref 1.5–7.7)
NEUTROPHILS # BLD AUTO: 9.4 X10(3) UL (ref 1.5–7.7)
NEUTROPHILS NFR BLD AUTO: 83.9 %
OSMOLALITY SERPL CALC.SUM OF ELEC: 278 MOSM/KG (ref 275–295)
PLATELET # BLD AUTO: 401 10(3)UL (ref 150–450)
POTASSIUM SERPL-SCNC: 4.7 MMOL/L (ref 3.5–5.1)
PROTHROMBIN TIME: 36.7 SECONDS (ref 11.8–14.5)
RBC # BLD AUTO: 3.65 X10(6)UL
SARS-COV-2 RNA RESP QL NAA+PROBE: NOT DETECTED
SODIUM SERPL-SCNC: 131 MMOL/L (ref 136–145)
WBC # BLD AUTO: 11.2 X10(3) UL (ref 4–11)

## 2020-12-27 PROCEDURE — 83605 ASSAY OF LACTIC ACID: CPT | Performed by: EMERGENCY MEDICINE

## 2020-12-27 PROCEDURE — 87040 BLOOD CULTURE FOR BACTERIA: CPT | Performed by: EMERGENCY MEDICINE

## 2020-12-27 PROCEDURE — 36415 COLL VENOUS BLD VENIPUNCTURE: CPT

## 2020-12-27 PROCEDURE — 85730 THROMBOPLASTIN TIME PARTIAL: CPT | Performed by: EMERGENCY MEDICINE

## 2020-12-27 PROCEDURE — 80048 BASIC METABOLIC PNL TOTAL CA: CPT | Performed by: EMERGENCY MEDICINE

## 2020-12-27 PROCEDURE — 99285 EMERGENCY DEPT VISIT HI MDM: CPT

## 2020-12-27 PROCEDURE — 96367 TX/PROPH/DG ADDL SEQ IV INF: CPT

## 2020-12-27 PROCEDURE — 85025 COMPLETE CBC W/AUTO DIFF WBC: CPT | Performed by: EMERGENCY MEDICINE

## 2020-12-27 PROCEDURE — 87070 CULTURE OTHR SPECIMN AEROBIC: CPT | Performed by: EMERGENCY MEDICINE

## 2020-12-27 PROCEDURE — 85610 PROTHROMBIN TIME: CPT | Performed by: EMERGENCY MEDICINE

## 2020-12-27 PROCEDURE — 96365 THER/PROPH/DIAG IV INF INIT: CPT

## 2020-12-27 PROCEDURE — 87205 SMEAR GRAM STAIN: CPT | Performed by: EMERGENCY MEDICINE

## 2020-12-27 RX ORDER — LEVOTHYROXINE SODIUM 0.2 MG/1
175 TABLET ORAL
COMMUNITY

## 2020-12-27 RX ORDER — VANCOMYCIN HYDROCHLORIDE
15
Status: DISCONTINUED | OUTPATIENT
Start: 2020-12-29 | End: 2020-12-29

## 2020-12-27 RX ORDER — CEPHALEXIN 500 MG/1
500 CAPSULE ORAL 3 TIMES DAILY
Status: ON HOLD | COMMUNITY
Start: 2020-12-24 | End: 2021-01-04

## 2020-12-27 RX ORDER — ALUMINA, MAGNESIA, AND SIMETHICONE 2400; 2400; 240 MG/30ML; MG/30ML; MG/30ML
10 SUSPENSION ORAL EVERY 6 HOURS PRN
COMMUNITY

## 2020-12-27 RX ORDER — ONDANSETRON 4 MG/1
4 TABLET, FILM COATED ORAL EVERY 6 HOURS PRN
COMMUNITY

## 2020-12-27 RX ORDER — VANCOMYCIN HYDROCHLORIDE
25 ONCE
Status: COMPLETED | OUTPATIENT
Start: 2020-12-27 | End: 2020-12-28

## 2020-12-28 ENCOUNTER — APPOINTMENT (OUTPATIENT)
Dept: CT IMAGING | Facility: HOSPITAL | Age: 85
DRG: 603 | End: 2020-12-28
Attending: INTERNAL MEDICINE
Payer: MEDICARE

## 2020-12-28 LAB
ANION GAP SERPL CALC-SCNC: 5 MMOL/L (ref 0–18)
BUN BLD-MCNC: 25 MG/DL (ref 7–18)
BUN/CREAT SERPL: 20.8 (ref 10–20)
CALCIUM BLD-MCNC: 9 MG/DL (ref 8.5–10.1)
CHLORIDE SERPL-SCNC: 101 MMOL/L (ref 98–112)
CO2 SERPL-SCNC: 27 MMOL/L (ref 21–32)
CREAT BLD-MCNC: 1.2 MG/DL
DEPRECATED RDW RBC AUTO: 54.9 FL (ref 35.1–46.3)
ERYTHROCYTE [DISTWIDTH] IN BLOOD BY AUTOMATED COUNT: 18.1 % (ref 11–15)
GLUCOSE BLD-MCNC: 86 MG/DL (ref 70–99)
HCT VFR BLD AUTO: 29.1 %
HGB BLD-MCNC: 9.2 G/DL
INR BLD: 3.17 (ref 0.9–1.2)
MCH RBC QN AUTO: 26.6 PG (ref 26–34)
MCHC RBC AUTO-ENTMCNC: 31.6 G/DL (ref 31–37)
MCV RBC AUTO: 84.1 FL
OSMOLALITY SERPL CALC.SUM OF ELEC: 280 MOSM/KG (ref 275–295)
PLATELET # BLD AUTO: 376 10(3)UL (ref 150–450)
POTASSIUM SERPL-SCNC: 4.4 MMOL/L (ref 3.5–5.1)
PROCALCITONIN SERPL-MCNC: 0.47 NG/ML (ref ?–0.16)
PROTHROMBIN TIME: 32 SECONDS (ref 11.8–14.5)
RBC # BLD AUTO: 3.46 X10(6)UL
SODIUM SERPL-SCNC: 133 MMOL/L (ref 136–145)
WBC # BLD AUTO: 9.4 X10(3) UL (ref 4–11)

## 2020-12-28 PROCEDURE — 85027 COMPLETE CBC AUTOMATED: CPT | Performed by: HOSPITALIST

## 2020-12-28 PROCEDURE — 73700 CT LOWER EXTREMITY W/O DYE: CPT | Performed by: INTERNAL MEDICINE

## 2020-12-28 PROCEDURE — 84145 PROCALCITONIN (PCT): CPT | Performed by: INTERNAL MEDICINE

## 2020-12-28 PROCEDURE — 85610 PROTHROMBIN TIME: CPT | Performed by: HOSPITALIST

## 2020-12-28 PROCEDURE — 87641 MR-STAPH DNA AMP PROBE: CPT | Performed by: INTERNAL MEDICINE

## 2020-12-28 PROCEDURE — 80048 BASIC METABOLIC PNL TOTAL CA: CPT | Performed by: HOSPITALIST

## 2020-12-28 RX ORDER — ACETAMINOPHEN 325 MG/1
650 TABLET ORAL EVERY 6 HOURS PRN
Status: DISCONTINUED | OUTPATIENT
Start: 2020-12-28 | End: 2021-01-02

## 2020-12-28 RX ORDER — LEVOTHYROXINE SODIUM 0.1 MG/1
200 TABLET ORAL
Status: DISCONTINUED | OUTPATIENT
Start: 2020-12-28 | End: 2021-01-05

## 2020-12-28 NOTE — PROGRESS NOTES
[facility] Dosing Service    Initial Pharmacokinetic Consult for Vancomycin Dosing     Ishaan Ewing is a 80year old patient who is being treated for cellulitis and abscess .   Pharmacy has been asked to dose Vancomycin by Dr. Pallavi Novak    Allergies:  Co

## 2020-12-28 NOTE — PLAN OF CARE
Patient sleeping comfortably in bed. Denies pain this shift except during dressing change. Call light within reach. Bed low and locked. Coumadin for DVT ppx. NPO maintained except sips with meds. Vanco and zosyn.  Ambulating by stand and pivot to the tristan

## 2020-12-28 NOTE — ED NOTES
Orders for admission, patient is aware of plan and ready to go upstairs. Any questions, please call ED JARROD Ramos  at extension [phone].    Type of COVID test sent:Rapid  COVID Suspicion level: Negative    Titratable drug(s) infusing:Vancomycin 1.5g  Rate:167

## 2020-12-28 NOTE — ED PROVIDER NOTES
Patient Seen in: [facility] AND [facility] Emergency Department      History   Patient presents with:  Cellulitis    Stated Complaint: rle cellulitis    HPI    Patient presents to the emergency department with right lower leg swelling redness and cellulitis.   Fo above.    Physical Exam     ED Triage Vitals [12/27/20 2109]   BP (!) 163/94   Pulse 96   Resp 18   Temp 98.4 °F (36.9 °C)   Temp src Oral   SpO2 96 %   O2 Device None (Room air)       Current:/81   Pulse 70   Temp 98.4 °F (36.9 °C) (Oral)   Resp 18 components within normal limits   CBC W/ DIFFERENTIAL - Abnormal; Notable for the following components:    WBC 11.2 (*)     RBC 3.65 (*)     HGB 10.0 (*)     HCT 30.9 (*)     RDW-SD 55.8 (*)     RDW 18.3 (*)     Neutrophil Absolute Prelim 9.40 (*)     Neut 12/27/2020 Unknown    Hyponatremia E87.1 12/27/2020 Yes

## 2020-12-28 NOTE — ED INITIAL ASSESSMENT (HPI)
Pt to ed via ems for rle swelling Per ems, pt had an abscess on her r hip and now the c/o rle swelling. Per NH RN report pta, pt was started on keflex for infection.

## 2020-12-28 NOTE — ED PROVIDER NOTES
Patient signed out to me by Dr. Marlon Ramirez. Patient INR is 3.78 and does not require any pulmonary embolism or DVT testing at this time. Discussed with orthopedic who states he will see the patient in the morning.

## 2020-12-28 NOTE — H&P
[facility] Hospitalist H&P       CC: Right buttock abscess       PCP: Ever Alonzo MD    History of Present Illness:   Mrs. Paola Bianchi is a 80year old female with a history of Atrial fibrillation on coumadin, PE, Stroke, hypothyroidism, Hypertension, who prese breakfast., Disp: , Rfl:     •  Alum & Mag Hydroxide-Simeth 153-932-27 MG/5ML Oral Suspension, Take 10 mL by mouth 4 (four) times daily as needed for Indigestion. , Disp: , Rfl:     •  Ondansetron HCl (ZOFRAN) 4 mg tablet, Take 4 mg by mouth every 6 (six) h Brother          OBJECTIVE:  /82 (BP Location: Right arm)   Pulse 73   Temp 98.4 °F (36.9 °C) (Oral)   Resp 17   Ht 5' 4\" (1.626 m)   Wt 171 lb 9.6 oz (77.8 kg)   LMP 01/01/1987   SpO2 98%   BMI 29.46 kg/m²   General: Alert, no acute distress  Lungs

## 2020-12-28 NOTE — CONSULTS
[facility] AND [facility] Infectious Disease  Report of Consultation    Va Ybarra Patient Status:  Inpatient    1925 MRN [id number]   [facility] 4W/SW/SE Attending Maikel Saavedra, [address] [facility] Day # 1 PCP Madelin Holder MD     Lb Disorder Mother    • Diabetes Mother    • Hypertension Mother    • Heart Disorder Sister    • Cancer Brother         colon   • Cancer Brother         Leukemia   • Heart Disorder Brother       reports that she has never smoked.  She has never used smokeless hrs:   BP Temp Temp src Pulse Resp SpO2 Height Weight   12/28/20 1055 155/76 98.4 °F (36.9 °C) Oral 80 — 97 % — —   12/28/20 0559 138/82 98.4 °F (36.9 °C) Oral 73 17 98 % — —   12/27/20 2334 (!) 166/86 98.1 °F (36.7 °C) Oral 74 18 97 % — 171 lb 9.6 oz (77. screen  - If there is a possibility of hardware involvement this may become a surgical issue which could be difficult in this elderly patient  - Cultures pending, negative thus far  - IV vancomycin ongoing    2.   Low grade leukocytosis due to the above  -

## 2020-12-28 NOTE — PROGRESS NOTES
[facility] Dosing Service    Initial Pharmacokinetic Consult for Vancomycin Dosing     Eliecer Schofield is a 80year old patient who is being treated for cellulitis.   Pharmacy has been asked to dose Vancomycin by Dr. Venessa Sheridan:  Levon Hairston

## 2020-12-29 LAB
ANION GAP SERPL CALC-SCNC: 5 MMOL/L (ref 0–18)
BASOPHILS # BLD AUTO: 0.05 X10(3) UL (ref 0–0.2)
BASOPHILS NFR BLD AUTO: 0.6 %
BUN BLD-MCNC: 24 MG/DL (ref 7–18)
BUN/CREAT SERPL: 21.2 (ref 10–20)
CALCIUM BLD-MCNC: 9 MG/DL (ref 8.5–10.1)
CHLORIDE SERPL-SCNC: 101 MMOL/L (ref 98–112)
CO2 SERPL-SCNC: 26 MMOL/L (ref 21–32)
CREAT BLD-MCNC: 1.13 MG/DL
DEPRECATED RDW RBC AUTO: 55.8 FL (ref 35.1–46.3)
EOSINOPHIL # BLD AUTO: 0.23 X10(3) UL (ref 0–0.7)
EOSINOPHIL NFR BLD AUTO: 2.5 %
ERYTHROCYTE [DISTWIDTH] IN BLOOD BY AUTOMATED COUNT: 18 % (ref 11–15)
GLUCOSE BLD-MCNC: 85 MG/DL (ref 70–99)
HCT VFR BLD AUTO: 28.8 %
HGB BLD-MCNC: 9.1 G/DL
IMM GRANULOCYTES # BLD AUTO: 0.06 X10(3) UL (ref 0–1)
IMM GRANULOCYTES NFR BLD: 0.7 %
INR BLD: 1.51 (ref 0.9–1.2)
LYMPHOCYTES # BLD AUTO: 0.8 X10(3) UL (ref 1–4)
LYMPHOCYTES NFR BLD AUTO: 8.8 %
MCH RBC QN AUTO: 26.8 PG (ref 26–34)
MCHC RBC AUTO-ENTMCNC: 31.6 G/DL (ref 31–37)
MCV RBC AUTO: 85 FL
MONOCYTES # BLD AUTO: 0.87 X10(3) UL (ref 0.1–1)
MONOCYTES NFR BLD AUTO: 9.6 %
MRSA DNA SPEC QL NAA+PROBE: NEGATIVE
NEUTROPHILS # BLD AUTO: 7.04 X10 (3) UL (ref 1.5–7.7)
NEUTROPHILS # BLD AUTO: 7.04 X10(3) UL (ref 1.5–7.7)
NEUTROPHILS NFR BLD AUTO: 77.8 %
OSMOLALITY SERPL CALC.SUM OF ELEC: 277 MOSM/KG (ref 275–295)
PLATELET # BLD AUTO: 375 10(3)UL (ref 150–450)
POTASSIUM SERPL-SCNC: 4.2 MMOL/L (ref 3.5–5.1)
PROTHROMBIN TIME: 17.9 SECONDS (ref 11.8–14.5)
RBC # BLD AUTO: 3.39 X10(6)UL
SODIUM SERPL-SCNC: 132 MMOL/L (ref 136–145)
WBC # BLD AUTO: 9.1 X10(3) UL (ref 4–11)

## 2020-12-29 PROCEDURE — 80048 BASIC METABOLIC PNL TOTAL CA: CPT | Performed by: INTERNAL MEDICINE

## 2020-12-29 PROCEDURE — 85025 COMPLETE CBC W/AUTO DIFF WBC: CPT | Performed by: INTERNAL MEDICINE

## 2020-12-29 PROCEDURE — 85610 PROTHROMBIN TIME: CPT | Performed by: HOSPITALIST

## 2020-12-29 NOTE — PROGRESS NOTES
[facility] Hospitalist Progress Note     CC: Hospital Follow up  PCP: Gianni Orozco MD     Assessment/Plan:   Mrs. Cathie Ibanez is a 80year old female with a history of Atrial fibrillation on coumadin, PE, Stroke, hypothyroidism, Hypertension, who presents for eval Last 3 Weights  12/27/20 2334 : 171 lb 9.6 oz (77.8 kg)  12/27/20 2145 : 170 lb (77.1 kg)  03/10/20 1504 : 165 lb (74.8 kg)  01/13/20 1553 : 165 lb (74.8 kg)      /71 (BP Location: Right arm)   Pulse 76   Temp 98.6 °F (37 °C) (Oral)   Resp 18 pubic rami.    Dictated by (CST): Leighann Castillo MD on 12/28/2020 at 6:20 PM     Finalized by (CST): Leighann Castillo MD on 12/28/2020 at 6:28 PM          Meds:     • Levothyroxine Sodium  200 mcg Oral Before breakfast   • metoprolol Tartrate  25 mg Oral Q12H   •

## 2020-12-29 NOTE — PROGRESS NOTES
[facility] AND [facility] Infectious Disease  Progress Note    Nunu Lockett Patient Status:  Inpatient    1925 MRN [id number]   Location [facility] 4W/SW/SE Attending Sergey Kerns, [facility] Day # 2 PCP Sarahi Villa MD     Subjective:  Cornelius Dangelo could be difficult in this elderly patient  - Cultures negative  - MRSA screen negative  - IV vancomycin ongoing     2. Low grade leukocytosis due to the above  - At [facility] and normalized today, will trend     3. Disposition - inpatient.   Trending temps and

## 2020-12-29 NOTE — PLAN OF CARE
Problem: Patient Centered Care  Goal: Patient preferences are identified and integrated in the patient's plan of care  Description: Interventions:  - What would you like us to know as we care for you?  I READ LIPS   - Provide timely, complete, and accurat pt to call for assistance with activity based on assessment  - Modify environment to reduce risk of injury  - Provide assistive devices as appropriate  - Consider OT/PT consult to assist with strengthening/mobility  - Encourage toileting schedule  Outcome: as ordered and tolerated  - Nasogastric tube to low intermittent suction as ordered  - Evaluate effectiveness of ordered antiemetic medications  - Provide nonpharmacologic comfort measures as appropriate  - Advance diet as tolerated, if ordered  - Obtain n Initiate isolation precautions as appropriate  - Initiate Pressure Ulcer prevention bundle as indicated  Outcome: Progressing   PATIENT IS ALERT AND ORIENTED X4. PATIENT IS ON ROOM AIR AND SATURATING WITHIN NORMAL LIMITS.  PATIENT IS MONITORED BY TELE#63 AN

## 2020-12-29 NOTE — PROGRESS NOTES
Patient alert and oriented x4, hard of hearing, especially with staff wearing the mask. Continues with IV abx. Patient started on general diet. Tolerated it well. ID by to see patient, still awaiting on ortho consult.  Patient sent for CT of the hip this ev

## 2020-12-29 NOTE — PAYOR COMM NOTE
--------------  ADMISSION REVIEW     Payor: MEDICARE ADVANTAGE PROGRAM (GENERAL)  Subscriber #:  [id number]  Authorization Number: [id number]    Admit date: 12/27/20  Admit time: 2323       Admitting Physician: Tisha Andres DO  Attending Physician R knee, total   • KNEE REPLACEMENT SURGERY  6/1990    L knee, total   • OTHER SURGICAL HISTORY  1947    Bowel obstruction (adhesions)   • SKIN SURGERY  1/2008    Basal cell carcinoma - face   • TONSILLECTOMY  at age 25    adnoids removed also     Review o Mental Status: She is alert and oriented to person, place, and time. Cranial Nerves: No cranial nerve deficit. Sensory: No sensory deficit.      ED Course     Labs Reviewed   BASIC METABOLIC PANEL (8) - Abnormal; Notable for the following compo Patient signed out to me by Dr. Teena Madrigal. Patient INR is 3.78 and does not require any pulmonary embolism or DVT testing at this time. Discussed with orthopedic who states he will see the patient in the morning.     Ratna Jason, DO on 12/27/2020 10:52 2116 12/28/20  0559   * 133*   K 4.7 4.4   CL 97* 101   CO2 28.0 27.0   BUN 29* 25*   CREATSERUM 1.18* 1.20*   GLU 98 86   CA 9.3 9.0       ASSESSMENT / PLAN:   Mrs. Jonny Woods is a history of Atrial fibrillation on coumadin, PE, Stroke, hypothyroidism,   K 4.4 12/28/2020      12/28/2020     CO2 27.0 12/28/2020     GLU 86 12/28/2020     CA 9.0 12/28/2020     PTT 84.0 12/27/2020     INR 3.17 12/28/2020     Assessment and Plan:   1.  Evolving R buttock process in this patient with a h/o THR on the R s Which infection is this being used to treat?  Stat/one time dose    2154-New Bag   2240-Stopped            vancomycin IVPB premix 1.25g in 0.9% NaCl 250 mL   Dose: 15 mg/kg  Weight Dosing Info: 77.1 kg  Freq: Every 48 hours Route: IV  Start: 12/29/20 2300

## 2020-12-30 ENCOUNTER — APPOINTMENT (OUTPATIENT)
Dept: INTERVENTIONAL RADIOLOGY/VASCULAR | Facility: HOSPITAL | Age: 85
DRG: 603 | End: 2020-12-30
Attending: CLINICAL NURSE SPECIALIST
Payer: MEDICARE

## 2020-12-30 LAB
ANION GAP SERPL CALC-SCNC: 5 MMOL/L (ref 0–18)
BASOPHILS # BLD AUTO: 0.04 X10(3) UL (ref 0–0.2)
BASOPHILS NFR BLD AUTO: 0.4 %
BUN BLD-MCNC: 25 MG/DL (ref 7–18)
BUN/CREAT SERPL: 21.7 (ref 10–20)
CALCIUM BLD-MCNC: 9.3 MG/DL (ref 8.5–10.1)
CHLORIDE SERPL-SCNC: 103 MMOL/L (ref 98–112)
CO2 SERPL-SCNC: 28 MMOL/L (ref 21–32)
CREAT BLD-MCNC: 1.15 MG/DL
DEPRECATED RDW RBC AUTO: 57.3 FL (ref 35.1–46.3)
EOSINOPHIL # BLD AUTO: 0.26 X10(3) UL (ref 0–0.7)
EOSINOPHIL NFR BLD AUTO: 2.6 %
ERYTHROCYTE [DISTWIDTH] IN BLOOD BY AUTOMATED COUNT: 18.2 % (ref 11–15)
GLUCOSE BLD-MCNC: 98 MG/DL (ref 70–99)
HCT VFR BLD AUTO: 30.7 %
HGB BLD-MCNC: 9.6 G/DL
IMM GRANULOCYTES # BLD AUTO: 0.08 X10(3) UL (ref 0–1)
IMM GRANULOCYTES NFR BLD: 0.8 %
INR BLD: 1.47 (ref 0.9–1.2)
LYMPHOCYTES # BLD AUTO: 0.97 X10(3) UL (ref 1–4)
LYMPHOCYTES NFR BLD AUTO: 9.8 %
MCH RBC QN AUTO: 27.1 PG (ref 26–34)
MCHC RBC AUTO-ENTMCNC: 31.3 G/DL (ref 31–37)
MCV RBC AUTO: 86.7 FL
MONOCYTES # BLD AUTO: 0.97 X10(3) UL (ref 0.1–1)
MONOCYTES NFR BLD AUTO: 9.8 %
NEUTROPHILS # BLD AUTO: 7.62 X10 (3) UL (ref 1.5–7.7)
NEUTROPHILS # BLD AUTO: 7.62 X10(3) UL (ref 1.5–7.7)
NEUTROPHILS NFR BLD AUTO: 76.6 %
OSMOLALITY SERPL CALC.SUM OF ELEC: 286 MOSM/KG (ref 275–295)
PLATELET # BLD AUTO: 368 10(3)UL (ref 150–450)
POTASSIUM SERPL-SCNC: 4.6 MMOL/L (ref 3.5–5.1)
PROTHROMBIN TIME: 17.6 SECONDS (ref 11.8–14.5)
RBC # BLD AUTO: 3.54 X10(6)UL
SODIUM SERPL-SCNC: 136 MMOL/L (ref 136–145)
WBC # BLD AUTO: 9.9 X10(3) UL (ref 4–11)

## 2020-12-30 PROCEDURE — 10030 IMG GID FLU COLL DRG SFT TIS: CPT

## 2020-12-30 PROCEDURE — 87070 CULTURE OTHR SPECIMN AEROBIC: CPT | Performed by: CLINICAL NURSE SPECIALIST

## 2020-12-30 PROCEDURE — 87205 SMEAR GRAM STAIN: CPT | Performed by: CLINICAL NURSE SPECIALIST

## 2020-12-30 PROCEDURE — 85025 COMPLETE CBC W/AUTO DIFF WBC: CPT | Performed by: INTERNAL MEDICINE

## 2020-12-30 PROCEDURE — 80048 BASIC METABOLIC PNL TOTAL CA: CPT | Performed by: INTERNAL MEDICINE

## 2020-12-30 PROCEDURE — 85610 PROTHROMBIN TIME: CPT | Performed by: HOSPITALIST

## 2020-12-30 PROCEDURE — 87075 CULTR BACTERIA EXCEPT BLOOD: CPT | Performed by: CLINICAL NURSE SPECIALIST

## 2020-12-30 PROCEDURE — 0J9L3ZZ DRAINAGE OF RIGHT UPPER LEG SUBCUTANEOUS TISSUE AND FASCIA, PERCUTANEOUS APPROACH: ICD-10-PCS | Performed by: RADIOLOGY

## 2020-12-30 PROCEDURE — 99152 MOD SED SAME PHYS/QHP 5/>YRS: CPT

## 2020-12-30 RX ORDER — LIDOCAINE HYDROCHLORIDE 20 MG/ML
INJECTION, SOLUTION EPIDURAL; INFILTRATION; INTRACAUDAL; PERINEURAL
Status: COMPLETED
Start: 2020-12-30 | End: 2020-12-30

## 2020-12-30 RX ORDER — MIDAZOLAM HYDROCHLORIDE 1 MG/ML
INJECTION INTRAMUSCULAR; INTRAVENOUS
Status: COMPLETED
Start: 2020-12-30 | End: 2020-12-30

## 2020-12-30 NOTE — PROGRESS NOTES
[facility] Hospitalist Progress Note     CC: Hospital Follow up  PCP: Ever Alonzo MD     Assessment/Plan:   Mrs. Paola Bianchi is a 80year old female with a history of Atrial fibrillation on coumadin, PE, Stroke, hypothyroidism, Hypertension, who presents for eval SpO2 94%   BMI 29.46 kg/m²   General: Alert, no acute distress  Lungs: clear to ausculation bilaterally  Heart: Regular rate and rhythm  Abdomen: soft, non tender  Extremities: No edema  Skin: right lateral hip/buttock, large abscess      Data Review: influenza virus vaccine PF    Paige Bui DO

## 2020-12-30 NOTE — PROGRESS NOTES
Patient alert and oriented X4, vitals stable. Denies pain unless we are pulling tape off. Continues with IV abx. Tolerating diet. Up to chair and bathroom with assist and rolling chair, able to stand/pivot. PT/OT consult placed.  Plans for possible drain pl

## 2020-12-30 NOTE — PLAN OF CARE
Problem: Patient Centered Care  Goal: Patient preferences are identified and integrated in the patient's plan of care  Description: Interventions:  - What would you like us to know as we care for you  - Provide timely, complete, and accurate information for assistance with activity based on assessment  - Modify environment to reduce risk of injury  - Provide assistive devices as appropriate  - Consider OT/PT consult to assist with strengthening/mobility  - Encourage toileting schedule  Outcome: Progressin and tolerated  - Nasogastric tube to low intermittent suction as ordered  - Evaluate effectiveness of ordered antiemetic medications  - Provide nonpharmacologic comfort measures as appropriate  - Advance diet as tolerated, if ordered  - Obtain nutritional isolation precautions as appropriate  - Initiate Pressure Ulcer prevention bundle as indicated  Outcome: Progressing     Pt resting in bed. slept most of night. Dressing to left hip c/d/I. Denies pain or nausea. Depends in place. Safety measures in place.  Leelee Huntley

## 2020-12-30 NOTE — PRE-SEDATION ASSESSMENT
[place] [facility]  IR Pre-Procedure Sedation Assessment    History of snoring or sleep or apnea?    No    History of previous problems with anesthesia or sedation  No    Physical Findings:  Neck: nl ROM  CV: RRR  PULM: normal respiratory rate/effor

## 2020-12-30 NOTE — PROGRESS NOTES
[facility] AND [facility] Infectious Disease Progress Note    Ynes Borja Patient Status:  Inpatient    1925 MRN [id number]   Location [facility] 4W/SW/SE Attending Madyson Moreland, [address] [facility] Day # 3 PCP Chastity Harris MD     Subjective:  Pt 9.9 12/30/2020    HGB 9.6 12/30/2020    HCT 30.7 12/30/2020    .0 12/30/2020    CREATSERUM 1.15 12/30/2020    BUN 25 12/30/2020     12/30/2020    K 4.6 12/30/2020     12/30/2020    CO2 28.0 12/30/2020    GLU 98 12/30/2020    CA 9.3 12/30

## 2020-12-30 NOTE — PROCEDURES
[facility]  Procedure Note    Ana Marianoemy Hanna Patient Status:  Inpatient    1925 MRN [id number]   Location [facility] Attending Lexi Powell, [address] [facility] Day # 3 PCP Queenie Healy MD     Procedure: U/S

## 2020-12-30 NOTE — PHYSICAL THERAPY NOTE
Attempted physical therapy evaluation. Per EMR, patient is at IVS for drain placement. Will defer physical therapy evaluation and re-attempt as available.

## 2020-12-30 NOTE — CONSULTS
[facility]    Report of Consultation    Malva Loveless Patient Status:  Inpatient    1925 MRN [id number]   Location [facility] 4W/SW/SE Attending Xi Jovel, [address] [facility] Day # 2 PCP Ever Alonzo MD     Date of Admission: Relation Age of Onset   • Heart Disorder Father    • Other (Other) Father         Kidney disease   • Heart Disorder Mother    • Diabetes Mother    • Hypertension Mother    • Heart Disorder Sister    • Cancer Brother         colon   • Cancer Brother (Patient taking differently: Take 1,000 mg by mouth every 4 (four) hours as needed for Pain.  )    •  Warfarin Sodium 2 MG Oral Tab, Take 1 tablet (2 mg total) by mouth nightly.  (Patient taking differently: Take 3 mg by mouth nightly.  )    •  TraMADol HCl (H) 12/29/2020    PTP 17.9 (H) 12/29/2020    TSH 0.49 02/10/2018    ESRML 29 02/10/2018    CRP 0.6 02/10/2018         Imaging:  PROCEDURE:  CT HIP (BONE) RIGHT ([id number])     COMPARISON: None. INDICATIONS:  RT hip tenderness on site of incision.      Wendi Pittman imaging. Right hip arthroplasty is in gross anatomic position. 2.  Fluid collection lateral to the right hip in the continues since subcutaneous fat, measuring approximately 9 x 8 x 14 cm, suggesting liquified hematoma or abscess.   There is surrounding f

## 2020-12-30 NOTE — CM/SW NOTE
12/30: PT/OT evaluation pending. Patient has buttock cellulitis w/ abscess, drain placed today. Patient currently on IV abx, cultures pending. Spoke with Beth Rodriguez RN at [facility] ([phone]) where patient resides.  Beth Rodriguez states that patient accepting options to [employer] com- requested Shelly Herron notify SW of decision ASAP.    420pm: Received c/b from son Shelly Herron, he picked [employer]. Reservation made via [address] [place] [address] Germaine.  Spoke with Jose Wise, due to insurance changing tomorrow & closed for holiday &

## 2020-12-30 NOTE — PLAN OF CARE
PURVI drain placed in IR to right hip, serosanguinous drainage in bulb. Denies pain. Transfers 1 assist stand-pivot to rolling chair. Rocephin given per orders. Son at bedside and updated on plan of care.  Bed in lowest position, call light in reach, frequent

## 2020-12-31 LAB
BASOPHILS # BLD AUTO: 0.05 X10(3) UL (ref 0–0.2)
BASOPHILS NFR BLD AUTO: 0.5 %
DEPRECATED RDW RBC AUTO: 55.9 FL (ref 35.1–46.3)
EOSINOPHIL # BLD AUTO: 0.26 X10(3) UL (ref 0–0.7)
EOSINOPHIL NFR BLD AUTO: 2.4 %
ERYTHROCYTE [DISTWIDTH] IN BLOOD BY AUTOMATED COUNT: 18.1 % (ref 11–15)
HCT VFR BLD AUTO: 29.7 %
HGB BLD-MCNC: 9.3 G/DL
IMM GRANULOCYTES # BLD AUTO: 0.07 X10(3) UL (ref 0–1)
IMM GRANULOCYTES NFR BLD: 0.7 %
INR BLD: 1.71 (ref 0.9–1.2)
LYMPHOCYTES # BLD AUTO: 1.11 X10(3) UL (ref 1–4)
LYMPHOCYTES NFR BLD AUTO: 10.4 %
MCH RBC QN AUTO: 26.6 PG (ref 26–34)
MCHC RBC AUTO-ENTMCNC: 31.3 G/DL (ref 31–37)
MCV RBC AUTO: 85.1 FL
MONOCYTES # BLD AUTO: 0.96 X10(3) UL (ref 0.1–1)
MONOCYTES NFR BLD AUTO: 9 %
NEUTROPHILS # BLD AUTO: 8.2 X10 (3) UL (ref 1.5–7.7)
NEUTROPHILS # BLD AUTO: 8.2 X10(3) UL (ref 1.5–7.7)
NEUTROPHILS NFR BLD AUTO: 77 %
PLATELET # BLD AUTO: 367 10(3)UL (ref 150–450)
PROTHROMBIN TIME: 19.8 SECONDS (ref 11.8–14.5)
RBC # BLD AUTO: 3.49 X10(6)UL
WBC # BLD AUTO: 10.7 X10(3) UL (ref 4–11)

## 2020-12-31 PROCEDURE — 85025 COMPLETE CBC W/AUTO DIFF WBC: CPT | Performed by: INTERNAL MEDICINE

## 2020-12-31 PROCEDURE — 97116 GAIT TRAINING THERAPY: CPT

## 2020-12-31 PROCEDURE — 97166 OT EVAL MOD COMPLEX 45 MIN: CPT

## 2020-12-31 PROCEDURE — 85610 PROTHROMBIN TIME: CPT | Performed by: INTERNAL MEDICINE

## 2020-12-31 PROCEDURE — 97162 PT EVAL MOD COMPLEX 30 MIN: CPT

## 2020-12-31 PROCEDURE — 97530 THERAPEUTIC ACTIVITIES: CPT

## 2020-12-31 RX ORDER — WARFARIN SODIUM 2 MG/1
2 TABLET ORAL NIGHTLY
Status: DISCONTINUED | OUTPATIENT
Start: 2020-12-31 | End: 2021-01-05

## 2020-12-31 NOTE — PROGRESS NOTES
[facility]    Progress Note    Leland Vickers Patient Status:  Inpatient    1925 MRN [id number]   [facility] 4W/SW/SE Attending Tyrell Graves, [address] [facility] Day # 4 PCP Anastasiya Huerta MD        Subjective:   [address] [facility] distress. Alert and oriented x 3. HEENT: Moist mucous membranes. EOM-I. PERRL  Neck: No lymphadenopathy. No JVD. No carotid bruits. Respiratory: Clear to auscultation bilaterally. No wheezes. No rhonchi.   Cardiovascular: S1, S2.  Regular rate and rhyth

## 2020-12-31 NOTE — PROGRESS NOTES
[facility]    Progress Note    Abena Guaman Patient Status:  Inpatient    1925 MRN [id number]   [facility] 4W/SW/SE Attending Angela Engel, [address] [facility] Day # 3 PCP Bianca Alfonso MD        Subjective:   Param Copeland oriented x 3. HEENT: Moist mucous membranes. EOM-I. PERRL  Neck: No lymphadenopathy. No JVD. No carotid bruits. Respiratory: Clear to auscultation bilaterally. No wheezes. No rhonchi. Cardiovascular: S1, S2.  Regular rate and rhythm. No murmurs.  Aliza Zazueta

## 2020-12-31 NOTE — PROGRESS NOTES
yTrese Michel is a 80year old female. Patient presents with:  Cellulitis      HPI:    In chair in nad    REVIEW OF SYSTEMS:   A comprehensive 11 point review of systems was completed. Pertinent positives and negatives noted in the the HPI.     Allergie rales, or wheezes. CARDIO: RRR L5/R8, no rubs, clicks, heaves, or murmurs. GI:  Soft NT/ND, BS present, No masses , rebound, no HSM. EXTREMITIES: hip not viewed today  NEURO:  No focal neurologic deficits. DERM:  Warm, dry, no rashes.   IV Site: ok    I 99 mg/dL    Sodium 133 (L) 136 - 145 mmol/L    Potassium 4.4 3.5 - 5.1 mmol/L    Chloride 101 98 - 112 mmol/L    CO2 27.0 21.0 - 32.0 mmol/L    Anion Gap 5 0 - 18 mmol/L    BUN 25 (H) 7 - 18 mg/dL    Creatinine 1.20 (H) 0.55 - 1.02 mg/dL    BUN/CREA Ratio mmol/L    Anion Gap 5 0 - 18 mmol/L    BUN 25 (H) 7 - 18 mg/dL    Creatinine 1.15 (H) 0.55 - 1.02 mg/dL    BUN/CREA Ratio 21.7 (H) 10.0 - 20.0    Calcium, Total 9.3 8.5 - 10.1 mg/dL    Calculated Osmolality 286 275 - 295 mOsm/kg    GFR, Non-African [place] Prelim 9.40 (H) 1.50 - 7.70 x10 (3) uL    Neutrophil Absolute 9.40 (H) 1.50 - 7.70 x10(3) uL    Lymphocyte Absolute 0.71 (L) 1.00 - 4.00 x10(3) uL    Monocyte Absolute 0.79 0.10 - 1.00 x10(3) uL    Eosinophil Absolute 0.22 0.00 - 0.70 x10(3) uL    Basophil Absolute 0.26 0.00 - 0.70 x10(3) uL    Basophil Absolute 0.04 0.00 - 0.20 x10(3) uL    Immature Granulocyte Absolute 0.08 0.00 - 1.00 x10(3) uL    Neutrophil % 76.6 %    Lymphocyte % 9.8 %    Monocyte % 9.8 %    Eosinophil % 2.6 %    Basophil % 0.4 %    Im

## 2020-12-31 NOTE — PLAN OF CARE
Problem: Patient Centered Care  Goal: Patient preferences are identified and integrated in the patient's plan of care  Description: Interventions:  - What would you like us to know as we care for you?   - Provide timely, complete, and accurate informatio for assistance with activity based on assessment  - Modify environment to reduce risk of injury  - Provide assistive devices as appropriate  - Consider OT/PT consult to assist with strengthening/mobility  - Encourage toileting schedule  Outcome: Progressin and tolerated  - Nasogastric tube to low intermittent suction as ordered  - Evaluate effectiveness of ordered antiemetic medications  - Provide nonpharmacologic comfort measures as appropriate  - Advance diet as tolerated, if ordered  - Obtain nutritional isolation precautions as appropriate  - Initiate Pressure Ulcer prevention bundle as indicated  Outcome: Progressing     Pt resting in bed. Slept most of night. [place]. Up to bathroom with 2 assist and rolling chair. Dressing to right hip c/d/I.  Drain to bulb

## 2020-12-31 NOTE — PROGRESS NOTES
[facility] Hospitalist Progress Note     CC: Hospital Follow up  PCP: Ulysses Guarneri, MD     Assessment/Plan:   Mrs. Eric Alcazar is a 80year old female with a history of Atrial fibrillation on coumadin, PE, Stroke, hypothyroidism, Hypertension, who presents for eval 01/01/1987   SpO2 95%   BMI 29.46 kg/m²   General: Alert, no acute distress  Lungs: clear to ausculation bilaterally  Heart: Regular rate and rhythm  Abdomen: soft, non tender  Extremities: No edema  Skin: right lateral hip/buttock drain in place and drain DO

## 2020-12-31 NOTE — PHYSICAL THERAPY NOTE
PHYSICAL THERAPY EVALUATION - INPATIENT     Room Number: 456/456-A  Evaluation Date: 12/31/2020  Type of Evaluation: Initial   Physician Order: PT Eval and Treat    Presenting Problem: weakness and pain  Reason for Therapy: Mobility Dysfunction and Discha Hyperlipidemia    • Post-menopausal     LMP 1987   • Pulmonary embolism ([facility] [address] 1992   • Stroke [facility])    • Thyroid disease        Past Surgical History  Past Surgical History:   Procedure Laterality Date   • APPENDECTOMY  1935   • CARPAL TUNNEL RELEASE Ashok commode, etc.): A Little   -   Moving from lying on back to sitting on the side of the bed?: A Little   How much help from another person does the patient currently need. ..   -   Moving to and from a bed to a chair (including a wheelchair)?: A Lot   -   Ne

## 2020-12-31 NOTE — OCCUPATIONAL THERAPY NOTE
OCCUPATIONAL THERAPY EVALUATION - INPATIENT     Room Number: 456/456-A  Evaluation Date: 12/31/2020  Type of Evaluation: Initial  Presenting Problem: cellulitis;lle abscess    Physician Order: IP Consult to Occupational Therapy  Reason for Therapy: ADL/IAD Research supports that patients with this level of impairment may benefit from CHEN.      DISCHARGE RECOMMENDATIONS  OT Discharge Recommendations: Sub-acute rehabilitation  OT Device Recommendations: TBD    PLAN  OT Treatment Plan: Compensatory technique edu covid she was ambulating w/o assistive device and tending to personal care needs w/o assist. Pt indicated that since activity restrictions have been in place she has not been walking and has been restricted to use of wc.  Staff now assists w/ bathingman mod a  Lower Extremity Dressing: max a    Patient End of Session: Up in chair;Needs met;Call light within reach;RN aware of session/findings; All patient questions and concerns addressed    OT Goals  Patients self stated goal is: unstated     Patient will c

## 2021-01-01 LAB
INR BLD: 1.78 (ref 0.9–1.2)
PROTHROMBIN TIME: 20.4 SECONDS (ref 11.8–14.5)

## 2021-01-01 PROCEDURE — 85610 PROTHROMBIN TIME: CPT | Performed by: INTERNAL MEDICINE

## 2021-01-01 NOTE — PROGRESS NOTES
[facility] Hospitalist Progress Note     CC: Hospital Follow up  PCP: Bridgett Red MD     Assessment/Plan:   Mrs. Lozoya is a 80year old female with a history of Atrial fibrillation on coumadin, PE, Stroke, hypothyroidism, Hypertension, who presents for eval 4\" (1.626 m)   Wt 171 lb 9.6 oz (77.8 kg)   LMP 01/01/1987   SpO2 97%   BMI 29.46 kg/m²   General: Alert, no acute distress  Lungs: clear to ausculation bilaterally  Heart: Regular rate and rhythm  Abdomen: soft, non tender  Extremities: No edema  Skin: r Tartrate  25 mg Oral Q12H       acetaminophen, influenza virus vaccine PF    Paige Bui, DO

## 2021-01-01 NOTE — PROGRESS NOTES
[facility]    Progress Note    Bergland Mealing Patient Status:  Inpatient    1925 MRN [id number]   [facility] 4W/SW/SE Attending Dinah Fay, [address] [facility] Day # 5 PCP Gianni Orozco MD        Subjective:   [address] [facility] [Drains:195]  Drain serous    General: No acute distress. Alert and oriented x 3. HEENT: Moist mucous membranes. EOM-I. PERRL  Neck: No lymphadenopathy. No JVD. No carotid bruits. Respiratory: Clear to auscultation bilaterally. No wheezes. No rhonchi.

## 2021-01-01 NOTE — PLAN OF CARE
Problem: Patient Centered Care  Goal: Patient preferences are identified and integrated in the patient's plan of care  Description: Interventions:  - What would you like us to know as we care for you?  - Provide timely, complete, and accurate information for assistance with activity based on assessment  - Modify environment to reduce risk of injury  - Provide assistive devices as appropriate  - Consider OT/PT consult to assist with strengthening/mobility  - Encourage toileting schedule  Outcome: Progressin and tolerated  - Nasogastric tube to low intermittent suction as ordered  - Evaluate effectiveness of ordered antiemetic medications  - Provide nonpharmacologic comfort measures as appropriate  - Advance diet as tolerated, if ordered  - Obtain nutritional isolation precautions as appropriate  - Initiate Pressure Ulcer prevention bundle as indicated  Outcome: Progressing    Pt in chair in evening. Up with 2 assist to rolling chair. Slept most of night in bed. [place]. Denies pain. Drain to bulb suction.  Dressing

## 2021-01-01 NOTE — PROGRESS NOTES
[facility] AND [facility] Infectious Disease Progress Note    Jamil Hanson Patient Status:  Inpatient    1925 MRN [id number]   Location [facility] 4W/SW/SE Attending David Lee, [address] [facility] Day # 5 PCP Jack Celestin MD     Subjective:  Pt intact.     Labs:  Lab Results   Component Value Date    INR 1.78 01/01/2021         Assessment/Plan:    1.  R buttock cellulitis with abscess  -CT with 9x8x14 cm fluid collection of R hip, hematoma vs abscess  -s/p IR drainage on 12/30, cultures NGTD  -hx

## 2021-01-01 NOTE — PLAN OF CARE
Shannan Randall is aware of POC at this time. She is very hard of hearing. PURVI with milky output. Rocephin given per orders. Patient is a stand pivot to [facility] x2 assist.      Plan for rehab upon discharge.       Problem: Patient Centered Care  Goal: Patient prefer deficits and behaviors that affect risk of falls.   - [place] fall precautions as indicated by assessment.  - Educate pt/family on patient safety including physical limitations  - Instruct pt to call for assistance with activity based on assessment  - Mod weights  Outcome: Progressing     Problem: GASTROINTESTINAL - ADULT  Goal: Minimal or absence of nausea and vomiting  Description: INTERVENTIONS:  - Maintain adequate hydration with IV or PO as ordered and tolerated  - Nasogastric tube to low intermittent pressure ulcer development  - Assess and document skin integrity  - Assess and document dressing/incision, wound bed, drain sites and surrounding tissue  - Implement wound care per orders  - Initiate isolation precautions as appropriate  - Initiate Pressur

## 2021-01-01 NOTE — PLAN OF CARE
Miriam Mcgregor is aware of POC at this time. She is very hard of hearing. PURVI with milky output. Rocephin given per orders. Plan for rehab upon discharge.       Problem: Patient Centered Care  Goal: Patient preferences are identified and integrated in the pa falls.  - [place] fall precautions as indicated by assessment.  - Educate pt/family on patient safety including physical limitations  - Instruct pt to call for assistance with activity based on assessment  - Modify environment to reduce risk of injury  - GASTROINTESTINAL - ADULT  Goal: Minimal or absence of nausea and vomiting  Description: INTERVENTIONS:  - Maintain adequate hydration with IV or PO as ordered and tolerated  - Nasogastric tube to low intermittent suction as ordered  - Evaluate effectivenes skin integrity  - Assess and document dressing/incision, wound bed, drain sites and surrounding tissue  - Implement wound care per orders  - Initiate isolation precautions as appropriate  - Initiate Pressure Ulcer prevention bundle as indicated  Outcome: P

## 2021-01-02 LAB
INR BLD: 1.86 (ref 0.9–1.2)
PROTHROMBIN TIME: 21.1 SECONDS (ref 11.8–14.5)

## 2021-01-02 PROCEDURE — 85610 PROTHROMBIN TIME: CPT | Performed by: INTERNAL MEDICINE

## 2021-01-02 RX ORDER — ACETAMINOPHEN 500 MG
TABLET ORAL EVERY 6 HOURS PRN
Status: DISCONTINUED | OUTPATIENT
Start: 2021-01-02 | End: 2021-01-05

## 2021-01-02 NOTE — PLAN OF CARE
Mary Fraser is aware of POC at this time. She is very hard of hearing. PURVI with milky output. Rocephin given per orders. Order placed for midline. Patient is a stand pivot to [facility] x2 assist.      Plan for rehab upon discharge with IV abx.     Problem: Laura needs  - Identify cognitive and physical deficits and behaviors that affect risk of falls.   - [place] fall precautions as indicated by assessment.  - Educate pt/family on patient safety including physical limitations  - Instruct pt to call for assistance fluid balance, assess for edema, trend weights  Outcome: Progressing     Problem: GASTROINTESTINAL - ADULT  Goal: Minimal or absence of nausea and vomiting  Description: INTERVENTIONS:  - Maintain adequate hydration with IV or PO as ordered and tolerated Assess and document risk factors for pressure ulcer development  - Assess and document skin integrity  - Assess and document dressing/incision, wound bed, drain sites and surrounding tissue  - Implement wound care per orders  - Initiate isolation precautio

## 2021-01-02 NOTE — PROGRESS NOTES
[facility] Hospitalist Progress Note     CC: Hospital Follow up  PCP: Ever Alonzo MD     Assessment/Plan:   Mrs. Paola Bianchi is a 80year old female with a history of Atrial fibrillation on coumadin, PE, Stroke, hypothyroidism, Hypertension, who presents for eval Labs   Lab 12/29/20  0713 12/30/20  0710 12/31/20  0627   RBC 3.39* 3.54* 3.49*   HGB 9.1* 9.6* 9.3*   HCT 28.8* 30.7* 29.7*   MCV 85.0 86.7 85.1   MCH 26.8 27.1 26.6   MCHC 31.6 31.3 31.3   RDW 18.0* 18.2* 18.1*   NEPRELIM 7.04 7.62 8.20*   WBC 9.1 9.9 10

## 2021-01-02 NOTE — PLAN OF CARE
Patient is alert & oriented, very [place] with bilateral hearing aids. Room air. Vitals stable, afebrile. Denies any pain. IV Rocephin. Saline locked. Tolerating general diet. Voiding WNL. +Gas. Right HIP PURVI intact & draining.  Up with SNP to [facility] x2 assist. Safe injury  Description: INTERVENTIONS:  - Assess pt frequently for physical needs  - Identify cognitive and physical deficits and behaviors that affect risk of falls.   - [place] fall precautions as indicated by assessment.  - Educate pt/family on patient sa for signs of decreased coronary artery perfusion - ex.  Angina  - Evaluate fluid balance, assess for edema, trend weights  Outcome: Progressing     Problem: GASTROINTESTINAL - ADULT  Goal: Minimal or absence of nausea and vomiting  Description: INTERVENTION site(s) healing without S/S of infection  Description: INTERVENTIONS:  - Assess and document risk factors for pressure ulcer development  - Assess and document skin integrity  - Assess and document dressing/incision, wound bed, drain sites and surrounding

## 2021-01-02 NOTE — PROGRESS NOTES
[facility] Infectious Disease Progress Note    Malva Loveless Patient Status:  Inpatient    1925 MRN [id number]   Location [facility] 4W/SW/SE Attending Xi Jovel, [address] [facility] Day # 6 PCP Ever Alonzo MD     Subjective:  Pt induration, +tenderness, drain with purulent drainage  Neurologic: Cranial nerves are grossly intact.     Labs:  Lab Results   Component Value Date    INR 1.86 01/02/2021         Assessment/Plan:    1.  R buttock cellulitis with abscess  -CT with 9x8x14 cm

## 2021-01-03 LAB
INR BLD: 1.97 (ref 0.9–1.2)
PROTHROMBIN TIME: 22.1 SECONDS (ref 11.8–14.5)

## 2021-01-03 PROCEDURE — 85610 PROTHROMBIN TIME: CPT | Performed by: INTERNAL MEDICINE

## 2021-01-03 NOTE — PROGRESS NOTES
[facility]    Progress Note    Tyrese Michel Patient Status:  Inpatient    1925 MRN [id number]   [facility] 4W/SW/SE Attending Madison Welch, [address] [facility] Day # 7 PCP Manuelito Lux MD        Subjective:   [address] [facility] [Urine:2; Drains:205]  Drain serous    General: No acute distress. Alert and oriented x 3. HEENT: Moist mucous membranes. EOM-I. PERRL  Neck: No lymphadenopathy. No JVD. No carotid bruits. Respiratory: Clear to auscultation bilaterally. No wheezes.  No

## 2021-01-03 NOTE — PROGRESS NOTES
[facility] Hospitalist Progress Note     CC: Hospital Follow up  PCP: Bianca Alfonso MD     Assessment/Plan:   Mrs. Mihir Laguna is a 80year old female with a history of Atrial fibrillation on coumadin, PE, Stroke, hypothyroidism, Hypertension, who presents for eval Data Review:       Labs:     Recent Labs   Lab 12/29/20  0713 12/30/20  0710 12/31/20  0627   RBC 3.39* 3.54* 3.49*   HGB 9.1* 9.6* 9.3*   HCT 28.8* 30.7* 29.7*   MCV 85.0 86.7 85.1   MCH 26.8 27.1 26.6   MCHC 31.6 31.3 31.3   RDW 18.0* 18.2* 18.1*

## 2021-01-03 NOTE — PLAN OF CARE
Problem: PAIN - ADULT  Goal: Verbalizes/displays adequate comfort level or patient's stated pain goal  Description: INTERVENTIONS:  - Encourage pt to monitor pain and request assistance  - Assess pain using appropriate pain scale  - Administer analgesics appropriate  - Identify discharge learning needs (meds, wound care, etc)  - Arrange for interpreters to assist at discharge as needed  - Consider post-discharge preferences of patient/family/discharge partner  - Complete POLST form as appropriate  - Assess and activity  - Obtain nutritional consult as needed  - Establish a toileting routine/schedule  - Consider collaborating with pharmacy to review patient's medication profile  Outcome: Progressing  Goal: Maintains adequate nutritional intake (undernourished

## 2021-01-03 NOTE — PLAN OF CARE
Ghanshyam Arita is aware of POC at this time. She is very hard of hearing. Wound dressing changed. PURVI with milky output. Rocephin given per orders. Order placed for midline.      Patient is a stand pivot to [facility] x2 assist.      Plan for [facility] upon discha Assess pt frequently for physical needs  - Identify cognitive and physical deficits and behaviors that affect risk of falls.   - [place] fall precautions as indicated by assessment.  - Educate pt/family on patient safety including physical limitations  - perfusion - ex.  Angina  - Evaluate fluid balance, assess for edema, trend weights  Outcome: Progressing     Problem: GASTROINTESTINAL - ADULT  Goal: Minimal or absence of nausea and vomiting  Description: INTERVENTIONS:  - Maintain adequate hydration with infection  Description: INTERVENTIONS:  - Assess and document risk factors for pressure ulcer development  - Assess and document skin integrity  - Assess and document dressing/incision, wound bed, drain sites and surrounding tissue  - Implement wound care

## 2021-01-03 NOTE — PROGRESS NOTES
[facility] AND [facility] Infectious Disease  Progress Note    Jennet Curling Patient Status:  Inpatient    1925 MRN [id number]   [facility] 4W/SW/SE Attending Yaw Paredes, [address] [facility] Day # 7 PCP Valiant Habermann, MD     Subjective:  [facility] Infectious Disease  [phone]    1/3/2021  8:47 AM

## 2021-01-04 ENCOUNTER — APPOINTMENT (OUTPATIENT)
Dept: PICC SERVICES | Facility: HOSPITAL | Age: 86
DRG: 603 | End: 2021-01-04
Attending: NURSE PRACTITIONER
Payer: MEDICARE

## 2021-01-04 LAB
INR BLD: 2.19 (ref 0.9–1.2)
PROTHROMBIN TIME: 24 SECONDS (ref 11.8–14.5)

## 2021-01-04 PROCEDURE — 76937 US GUIDE VASCULAR ACCESS: CPT

## 2021-01-04 PROCEDURE — 36410 VNPNXR 3YR/> PHY/QHP DX/THER: CPT

## 2021-01-04 PROCEDURE — 97530 THERAPEUTIC ACTIVITIES: CPT

## 2021-01-04 PROCEDURE — 85610 PROTHROMBIN TIME: CPT | Performed by: INTERNAL MEDICINE

## 2021-01-04 RX ORDER — CEFTRIAXONE SODIUM 2 G/50ML
2 INJECTION, SOLUTION INTRAVENOUS EVERY 24 HOURS
Qty: 750 ML | Refills: 0 | Status: SHIPPED | OUTPATIENT
Start: 2021-01-04 | End: 2021-01-19

## 2021-01-04 RX ORDER — LIDOCAINE HYDROCHLORIDE 10 MG/ML
5 INJECTION, SOLUTION EPIDURAL; INFILTRATION; INTRACAUDAL; PERINEURAL ONCE
Status: DISCONTINUED | OUTPATIENT
Start: 2021-01-04 | End: 2021-01-05

## 2021-01-04 NOTE — PLAN OF CARE
Problem: Patient Centered Care  Goal: Patient preferences are identified and integrated in the patient's plan of care  Description: Interventions:  - What would you like us to know as we care for you?  [facility]ING [facility]  - Provide timely, limitations  - Instruct pt to call for assistance with activity based on assessment  - Modify environment to reduce risk of injury  - Provide assistive devices as appropriate  - Consider OT/PT consult to assist with strengthening/mobility  - Encourage toil hydration with IV or PO as ordered and tolerated  - Nasogastric tube to low intermittent suction as ordered  - Evaluate effectiveness of ordered antiemetic medications  - Provide nonpharmacologic comfort measures as appropriate  - Advance diet as tolerated care per orders  - Initiate isolation precautions as appropriate  - Initiate Pressure Ulcer prevention bundle as indicated  Outcome: Progressing   PATIENT IS ALERT AND ORIENTED X4. PATIENT IS ON ROOM AIR AND SATURATING WITHIN NORMAL LIMITS.  PATIENT IS ON C

## 2021-01-04 NOTE — PLAN OF CARE
Patient alert and oriented, forgetful at times. Patient on room air and dash pain, nausea or vomiting. Patient hard of hearing, bilateral hearing aids in. Patient on coumadin, history of Afib. Patient stand to pivot to rolling chair to bathroom.  PT worke pre-medicate as appropriate  Outcome: Progressing     Problem: RISK FOR INFECTION - ADULT  Goal: Absence of fever/infection during anticipated neutropenic period  Description: INTERVENTIONS  - Monitor WBC  - Administer growth factors as ordered  - Axel INTERVENTIONS:  - Monitor vital signs, rhythm, and trends  - Monitor for bleeding, hypotension and signs of decreased cardiac output  - Evaluate effectiveness of vasoactive medications to optimize hemodynamic stability  - Monitor arterial and/or venous pun ADULT  Goal: Incision(s), wounds(s) or drain site(s) healing without S/S of infection  Description: INTERVENTIONS:  - Assess and document risk factors for pressure ulcer development  - Assess and document skin integrity  - Assess and document dressing/inci

## 2021-01-04 NOTE — PROGRESS NOTES
[facility] Hospitalist Progress Note     CC: Hospital Follow up  PCP: Jeanette Muñoz MD     Assessment/Plan:   Mrs. Marcus Perla is a 80year old female with a history of Atrial fibrillation on coumadin, PE, Stroke, hypothyroidism, Hypertension, who presents for eval draining   Neuro: moving all 4 ext      Data Review:       Labs:     Recent Labs   Lab 12/29/20  0713 12/30/20  0710 12/31/20  0627   RBC 3.39* 3.54* 3.49*   HGB 9.1* 9.6* 9.3*   HCT 28.8* 30.7* 29.7*   MCV 85.0 86.7 85.1   MCH 26.8 27.1 26.6   MCHC 31.6 3

## 2021-01-04 NOTE — PHYSICAL THERAPY NOTE
PHYSICAL THERAPY TREATMENT NOTE - INPATIENT     Room Number: 456/456-A       Presenting Problem: weakness and pain    Problem List  Principal Problem:    Cellulitis and abscess of left leg  Active Problems:    Azotemia    Hyponatremia    Anemia      PHYSIC does the patient currently have. ..  -   Turning over in bed (including adjusting bedclothes, sheets and blankets)?: A Lot   -   Sitting down on and standing up from a chair with arms (e.g., wheelchair, bedside commode, etc.): A Little   -   Moving from [facility]

## 2021-01-04 NOTE — PROGRESS NOTES
[facility] AND [facility] Infectious Disease  Progress Note    Jamil Hanson Patient Status:  Inpatient    1925 MRN [id number]   Location [facility] 4W/SW/SE Attending Rhett Hunt MD   Hosp Day # 8 PCP Jack Celestin MD     Subjective:  Junior Olea EOT 1/19/2021. If there is R hip hardware involvement - she will ultimately need surgical intervention or else relapse will be expected. D/w Dr. Yumi Paez. Plans for rehab at d/c. D/w patient. Shirin Vela DO, [facility] Infectious Disease  [phone])

## 2021-01-05 VITALS
TEMPERATURE: 98 F | BODY MASS INDEX: 29.3 KG/M2 | HEIGHT: 64 IN | DIASTOLIC BLOOD PRESSURE: 76 MMHG | HEART RATE: 82 BPM | SYSTOLIC BLOOD PRESSURE: 155 MMHG | RESPIRATION RATE: 18 BRPM | OXYGEN SATURATION: 98 % | WEIGHT: 171.63 LBS

## 2021-01-05 LAB
ANION GAP SERPL CALC-SCNC: 3 MMOL/L (ref 0–18)
BASOPHILS # BLD AUTO: 0.06 X10(3) UL (ref 0–0.2)
BASOPHILS NFR BLD AUTO: 0.8 %
BUN BLD-MCNC: 21 MG/DL (ref 7–18)
BUN/CREAT SERPL: 23.6 (ref 10–20)
CALCIUM BLD-MCNC: 8.9 MG/DL (ref 8.5–10.1)
CHLORIDE SERPL-SCNC: 105 MMOL/L (ref 98–112)
CO2 SERPL-SCNC: 27 MMOL/L (ref 21–32)
CREAT BLD-MCNC: 0.89 MG/DL
DEPRECATED RDW RBC AUTO: 57.8 FL (ref 35.1–46.3)
EOSINOPHIL # BLD AUTO: 0.32 X10(3) UL (ref 0–0.7)
EOSINOPHIL NFR BLD AUTO: 4.5 %
ERYTHROCYTE [DISTWIDTH] IN BLOOD BY AUTOMATED COUNT: 18.4 % (ref 11–15)
GLUCOSE BLD-MCNC: 83 MG/DL (ref 70–99)
HCT VFR BLD AUTO: 29.6 %
HGB BLD-MCNC: 9 G/DL
IMM GRANULOCYTES # BLD AUTO: 0.03 X10(3) UL (ref 0–1)
IMM GRANULOCYTES NFR BLD: 0.4 %
INR BLD: 2.45 (ref 0.9–1.2)
LYMPHOCYTES # BLD AUTO: 0.88 X10(3) UL (ref 1–4)
LYMPHOCYTES NFR BLD AUTO: 12.3 %
MCH RBC QN AUTO: 26.6 PG (ref 26–34)
MCHC RBC AUTO-ENTMCNC: 30.4 G/DL (ref 31–37)
MCV RBC AUTO: 87.6 FL
MONOCYTES # BLD AUTO: 0.71 X10(3) UL (ref 0.1–1)
MONOCYTES NFR BLD AUTO: 9.9 %
NEUTROPHILS # BLD AUTO: 5.16 X10 (3) UL (ref 1.5–7.7)
NEUTROPHILS # BLD AUTO: 5.16 X10(3) UL (ref 1.5–7.7)
NEUTROPHILS NFR BLD AUTO: 72.1 %
OSMOLALITY SERPL CALC.SUM OF ELEC: 282 MOSM/KG (ref 275–295)
PLATELET # BLD AUTO: 355 10(3)UL (ref 150–450)
POTASSIUM SERPL-SCNC: 4.4 MMOL/L (ref 3.5–5.1)
PROTHROMBIN TIME: 26.2 SECONDS (ref 11.8–14.5)
RBC # BLD AUTO: 3.38 X10(6)UL
SARS-COV-2 RNA RESP QL NAA+PROBE: NOT DETECTED
SODIUM SERPL-SCNC: 135 MMOL/L (ref 136–145)
WBC # BLD AUTO: 7.2 X10(3) UL (ref 4–11)

## 2021-01-05 PROCEDURE — 97530 THERAPEUTIC ACTIVITIES: CPT

## 2021-01-05 PROCEDURE — 90471 IMMUNIZATION ADMIN: CPT

## 2021-01-05 PROCEDURE — 85025 COMPLETE CBC W/AUTO DIFF WBC: CPT | Performed by: HOSPITALIST

## 2021-01-05 PROCEDURE — 85610 PROTHROMBIN TIME: CPT | Performed by: INTERNAL MEDICINE

## 2021-01-05 PROCEDURE — 80048 BASIC METABOLIC PNL TOTAL CA: CPT | Performed by: HOSPITALIST

## 2021-01-05 PROCEDURE — 97116 GAIT TRAINING THERAPY: CPT

## 2021-01-05 NOTE — PLAN OF CARE
Patient alert and oriented, on room air. Patient hard of hearing with bilateral hearing aids in place. Patient denies pain, nausea or vomiting. Patient up with 2 assist to rolling chair. Patient voiding freely.  PURVI intact to suction to the right side, dress period  Description: INTERVENTIONS  - Monitor WBC  - Administer growth factors as ordered  - Implement neutropenic guidelines  Outcome: Progressing     Problem: SAFETY ADULT - FALL  Goal: Free from fall injury  Description: INTERVENTIONS:  - Assess pt freq of vasoactive medications to optimize hemodynamic stability  - Monitor arterial and/or venous puncture sites for bleeding and/or hematoma  - Assess quality of pulses, skin color and temperature  - Assess for signs of decreased coronary artery perfusion - e ulcer development  - Assess and document skin integrity  - Assess and document dressing/incision, wound bed, drain sites and surrounding tissue  - Implement wound care per orders  - Initiate isolation precautions as appropriate  - Initiate Pressure Ulcer p

## 2021-01-05 NOTE — PROGRESS NOTES
[facility] AND [facility] Infectious Disease  Progress Note    Naya Kline Patient Status:  Inpatient    1925 MRN [id number]   Location [facility] 4W/SW/SE Attending Jennifer Brown MD   [address] [facility] Day # 9 PCP Hakeem Dial DO     Subjective:  Dasharmila Martell ongoing through 1/19/20     2.  Low grade leukocytosis due to the above  - At [facility] and normalized today, will trend     3.  Disposition - stable from ID.  Midline has been ordered and anticipate IV ceftraixone ~3 weeks as she heals.  Tentative EOT 1/19/2021.

## 2021-01-05 NOTE — PROGRESS NOTES
[facility] Hospitalist Progress Note     CC: Hospital Follow up  PCP: Rico Song MD     Assessment/Plan:   Mrs. Calos Valdez is a 80year old female with a history of Atrial fibrillation on coumadin, PE, Stroke, hypothyroidism, Hypertension, who presents for eval 24 hours) at 1/5/2021 1337  Last data filed at 1/5/2021 0555  Gross per 24 hour   Intake 0 ml   Output 100 ml   Net -100 ml       Last 3 Weights  12/27/20 2334 : 171 lb 9.6 oz (77.8 kg)  12/27/20 2145 : 170 lb (77.1 kg)  03/10/20 1504 : 165 lb (74.8 kg)  0 fracture deformities of the right superior and inferior pubic rami.    Dictated by (CST): Merlyn Head MD on 12/28/2020 at 6:20 PM     Finalized by (CST): Merlyn Head MD on 12/28/2020 at 6:28 PM          Meds:     • lidocaine (PF)  5 mL Intradermal Once

## 2021-01-05 NOTE — PAYOR COMM NOTE
--------------  CONTINUED STAY REVIEW    Payor: MEDICARE ADVANTAGE PROGRAM (GENERAL)  Subscriber #:  [id number]  Authorization Number: [id number]    Admit date: 12/27/20  Admit time: 2323    Admitting Physician:   Attending Physician:  Moshe Furth, Landrum Merlin draining on admission. Now improving   -coordinated care with ortho, surgery and IR on admission. Abscess per ortho was not communicating with hip arthroplasty. General surgery recommended drain placement, which was placed on 12/30 by IR.  Cultures no growt (TYLENOL EXTRA STRENGTH) tab 500-1,000 mg     Date Action Dose Route User    1/4/2021 2120 Given 1,000 mg Oral Shemar Crowe RN      cefTRIAXone Sodium (ROCEPHIN) 2 g in sodium chloride 0.9% 100 mL MBP/ADD-vantage     Date Action Dose Route User    1/4/

## 2021-01-05 NOTE — PROGRESS NOTES
[facility]    Progress Note    Allan Triana Patient Status:  Inpatient    1925 MRN [id number]   [facility] 4W/SW/SE Attending Amelia Escalante, [address] [facility] Day # 9 PCP Zulma Kaur MD        Subjective:   [address] [facility] [place] [place] SpO2 99 %. I/O last 3 completed shifts: In: 100 [IV PIGGYBACK:100]  Out: 182 [Urine:2; Drains:180]  Drain serous    General: No acute distress. Alert and oriented x 3. HEENT: Moist mucous membranes. EOM-I. PERRL  Neck: No lymphadenopathy. No JVD.  No

## 2021-01-05 NOTE — PHYSICAL THERAPY NOTE
PHYSICAL THERAPY TREATMENT NOTE - INPATIENT     Room Number: 456/456-A       Presenting Problem: R hip buttock abcess/hematoma    Problem List  Principal Problem:    Cellulitis and abscess of left leg  Active Problems:    Azotemia    Hyponatremia    Anemia Static Standing: Poor +  Dynamic Standing: Poor        AM-PAC '6-Clicks' INPATIENT SHORT FORM - BASIC MOBILITY  How much difficulty does the patient currently have. ..  -   Turning over in bed (including adjusting bedclothes, sheets and blankets)?: A Leslie independence with home activity/exercise instructions provided to patient in preparation for discharge.    Goal #5   Current Status NT   Goal #6    Goal #6  Current Status

## 2021-01-05 NOTE — PLAN OF CARE
No acute events overnight. Denies pain. Vitals stable. PURVI Drain output documented. Fall precautions in place. Call light in reach. Frequent rounding performed.     Problem: Patient Centered Care  Goal: Patient preferences are identified and integrated in th cognitive and physical deficits and behaviors that affect risk of falls.   - [place] fall precautions as indicated by assessment.  - Educate pt/family on patient safety including physical limitations  - Instruct pt to call for assistance with activity bas assess for edema, trend weights  Outcome: Progressing     Problem: GASTROINTESTINAL - ADULT  Goal: Minimal or absence of nausea and vomiting  Description: INTERVENTIONS:  - Maintain adequate hydration with IV or PO as ordered and tolerated  - Nasogastric t risk factors for pressure ulcer development  - Assess and document skin integrity  - Assess and document dressing/incision, wound bed, drain sites and surrounding tissue  - Implement wound care per orders  - Initiate isolation precautions as appropriate  -

## 2021-01-05 NOTE — OCCUPATIONAL THERAPY NOTE
OCCUPATIONAL THERAPY TREATMENT NOTE - INPATIENT        Room Number: 456/456-A    Presenting Problem: cellulitis;lle abscess    Problem List  Principal Problem:    Cellulitis and abscess of left leg  Active Problems:    Azotemia    Hyponatremia    Anemia Toileting, which includes using toilet, bedpan or urinal? : A Lot  -   Putting on and taking off regular upper body clothing?: A Lot  -   Taking care of personal grooming such as brushing teeth?: None  -   Eating meals?: None    AM-PAC Score:  Score: 16  A

## 2021-01-05 NOTE — DISCHARGE SUMMARY
General Medicine Discharge Summary     Patient ID:  Leila Stanley year old  12/30/1925    Admit date: 12/27/2020    Discharge date and time: 1/5/2021    Attending Physician: Emely Keene MD     Consults: IP CONSULT TO PHARMACY  IP CONSULT TO HOSPITALIST general surgery, IR, ultimately decided on IR guided drain placement. Drain placed with significant improvement symptoms's and swelling, IV antibiotics continued and infectious disease recommended 3 weeks of IV ceftriaxone via PICC line.   Patient to jenniffer results found.       Disposition: SNF    Activity: activity as tolerated  Diet: regular diet  Wound Care: as directed  Code Status: DNAR/Comfort Care  O2: none    Home Medication Changes:     Med list     Medication List      START taking these medications Bring a paper prescription for each of these medications  · cefTRIAXone sodium in Dextrose IVPB premix         FU  Follow-up Information     Maria Luisa Palm MD. Schedule an appointment as soon as possible for a visit in 1 week.     Specialty: Family Med

## 2021-01-05 NOTE — PLAN OF CARE
Discharge orders received from medical and surgery. Discharge instructions, PCS form, face-sheet, and transport report printed and given to [address] [facility]  Patient discharged with PURVI drain and PICC line in ADULT  Goal: Absence of fever/infection during anticipated neutropenic period  Description: INTERVENTIONS  - Monitor WBC  - Administer growth factors as ordered  - Implement neutropenic guidelines  1/5/2021 1707 by Martin Zapata RN  Outcome: Adequate for Daija Barrientos RN  Outcome: Adequate for Discharge  1/5/2021 1015 by Daija Barrientos RN  Outcome: Progressing     Problem: CARDIOVASCULAR - ADULT  Goal: Maintains optimal cardiac output and hemodynamic stability  Description: INTERVENTIONS:  - Monitor vital Jw Horton RN  Outcome: Progressing  Goal: Maintains adequate nutritional intake (undernourished)  Description: INTERVENTIONS:  - Monitor percentage of each meal consumed  - Identify factors contributing to decreased intake, treat as appropriate  - Ass

## 2021-01-06 ENCOUNTER — SNF ADMIT/H&P (OUTPATIENT)
Dept: INTERNAL MEDICINE CLINIC | Facility: SKILLED NURSING FACILITY | Age: 86
End: 2021-01-06

## 2021-01-06 DIAGNOSIS — R53.1 WEAKNESS: ICD-10-CM

## 2021-01-06 DIAGNOSIS — L02.31 ABSCESS OF BUTTOCK, RIGHT: ICD-10-CM

## 2021-01-06 DIAGNOSIS — E87.1 HYPONATREMIA: ICD-10-CM

## 2021-01-06 PROCEDURE — 1111F DSCHRG MED/CURRENT MED MERGE: CPT | Performed by: NURSE PRACTITIONER

## 2021-01-06 PROCEDURE — 99310 SBSQ NF CARE HIGH MDM 45: CPT | Performed by: NURSE PRACTITIONER

## 2021-01-06 PROCEDURE — 1123F ACP DISCUSS/DSCN MKR DOCD: CPT | Performed by: NURSE PRACTITIONER

## 2021-01-06 NOTE — PAYOR COMM NOTE
--------------  DISCHARGE REVIEW    Payor: MEDICARE ADVANTAGE PROGRAM (GENERAL)  Subscriber #:  [id number]  Authorization Number: [id number]    Admit date: 12/27/20  Admit time:  2323  Discharge Date: 1/5/2021  5:50 PM     Admitting Physician:    Tye hypothyroidism, Hypertension, who presents for evaluation of right buttock/lateral hip abscess. She has a history of prior total hip replacement. Apparently the last severeal days it was noticed she was developing this abscess.  She states they attempted to general surgery in 1 week, consider outpatient CT of hip at that time.  -discussed with son that deeper seated is possible, but not evident at this time, but will need continued close monitoring to ensure this isn't the case down the road, if so aggressive GRAMS WITH LIQUID ONCE DAILY AS NEEDED FOR CONSTIPATION     potassium chloride 20 MEQ Pack  Commonly known as: KLOR-CON        STOP taking these medications    cephALEXin 500 MG Caps  Commonly known as: KEFLEX      MG Caps  Generic drug: docusate so therapeutic plan as outlined  Brian Luque M.D.   1/5/2021  5:25 PM         REVIEWER COMMENTS    FOR FINAL REVIEW/APPROVAL OF ALL 9 INPT DAYS

## 2021-01-06 NOTE — PROGRESS NOTES
HPI: Senia Taylor  Is a 79yo female with a history of Atrial fibrillation on coumadin, PE, stroke, hypothyroidism, hypertension who was admitted to [address] [place] [address] with right buttock/lateral hip abscess. She has a hx of prior total hip replacement.  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

## 2021-01-08 ENCOUNTER — SNF VISIT (OUTPATIENT)
Dept: INTERNAL MEDICINE CLINIC | Facility: SKILLED NURSING FACILITY | Age: 86
End: 2021-01-08

## 2021-01-08 DIAGNOSIS — I10 ESSENTIAL HYPERTENSION: ICD-10-CM

## 2021-01-08 DIAGNOSIS — R53.1 WEAKNESS: ICD-10-CM

## 2021-01-08 DIAGNOSIS — L02.31 ABSCESS OF BUTTOCK, RIGHT: ICD-10-CM

## 2021-01-08 PROCEDURE — 99309 SBSQ NF CARE MODERATE MDM 30: CPT | Performed by: NURSE PRACTITIONER

## 2021-01-08 NOTE — PROGRESS NOTES
HPI: Leland Vickers  Is a 79yo female with a history of Atrial fibrillation on coumadin, PE, stroke, hypothyroidism, hypertension who was admitted to [facility] with right buttock/lateral hip abscess. She has a hx of prior total hip replacement.  Patient was evalu Reviewed     SUBJECTIVE/REVIEW OF SYSTEMS:  Seen in room. Controlled pain. +weakness. No sob/cough/cp/palpitions. No hematuria/dysuria/frequency. No n/v/d. Afebrile.     PHYSICAL EXAM:  GENERAL HEALTH:NAD  HEENT: atraumatic/normocephalic, mucous membranes

## 2021-01-11 ENCOUNTER — SNF VISIT (OUTPATIENT)
Dept: INTERNAL MEDICINE CLINIC | Facility: SKILLED NURSING FACILITY | Age: 86
End: 2021-01-11

## 2021-01-11 DIAGNOSIS — L02.31 ABSCESS OF BUTTOCK, RIGHT: ICD-10-CM

## 2021-01-11 DIAGNOSIS — I48.91 ATRIAL FIBRILLATION, UNSPECIFIED TYPE (HCC): ICD-10-CM

## 2021-01-11 DIAGNOSIS — R53.1 WEAKNESS: ICD-10-CM

## 2021-01-11 PROCEDURE — 99309 SBSQ NF CARE MODERATE MDM 30: CPT | Performed by: NURSE PRACTITIONER

## 2021-01-11 NOTE — PROGRESS NOTES
HPI: Tatyana Locke  Is a 79yo female with a history of Atrial fibrillation on coumadin, PE, stroke, hypothyroidism, hypertension who was admitted to [facility] with right buttock/lateral hip abscess. She has a hx of prior total hip replacement.  Patient was evalu Reviewed     SUBJECTIVE/REVIEW OF SYSTEMS:  Seen in room. Controlled pain. No sob/cough/cp/palpitions. No hematuria/dysuria/frequency. No n/v/d. Afebrile.     PHYSICAL EXAM:  GENERAL HEALTH:NAD  HEENT: atraumatic/normocephalic, mucous membranes pink and douglas

## 2021-01-12 ENCOUNTER — TELEPHONE (OUTPATIENT)
Dept: INTERVENTIONAL RADIOLOGY/VASCULAR | Facility: HOSPITAL | Age: 86
End: 2021-01-12

## 2021-01-12 NOTE — TELEPHONE ENCOUNTER
Nitza Tavarez from [facility] called to scheduled patient for follow up visit with IR. Per Queenie, no clinic is required. Patient should get a CT Scan if there is no output in drain for 48 drain. Patient should follow up with surgeon. Nitza Tavarez understood instructions and will call IR if has any other concerns.

## 2021-01-13 ENCOUNTER — SNF VISIT (OUTPATIENT)
Dept: INTERNAL MEDICINE CLINIC | Facility: SKILLED NURSING FACILITY | Age: 86
End: 2021-01-13

## 2021-01-13 DIAGNOSIS — R60.0 EDEMA OF LOWER EXTREMITY: ICD-10-CM

## 2021-01-13 DIAGNOSIS — E87.1 HYPONATREMIA: ICD-10-CM

## 2021-01-13 DIAGNOSIS — R53.1 WEAKNESS: ICD-10-CM

## 2021-01-13 DIAGNOSIS — L02.31 ABSCESS OF BUTTOCK, RIGHT: ICD-10-CM

## 2021-01-13 PROCEDURE — 99308 SBSQ NF CARE LOW MDM 20: CPT | Performed by: NURSE PRACTITIONER

## 2021-01-13 NOTE — PROGRESS NOTES
HPI: Amber Urias  Is a 79yo female with a history of Atrial fibrillation on coumadin, PE, stroke, hypothyroidism, hypertension who was admitted to [facility] with right buttock/lateral hip abscess. She has a hx of prior total hip replacement.  Patient was evalu Reviewed     SUBJECTIVE/REVIEW OF SYSTEMS:  Seen in room. Comfortable. No sob/cough/cp/palpitions. No hematuria/dysuria/frequency. No n/v/d. Afebrile.     PHYSICAL EXAM:  GENERAL HEALTH:NAD  HEENT: atraumatic/normocephalic, mucous membranes pink and moist, since 2018 1/6 Na 134   on lasix  Weekly labs

## 2021-01-18 ENCOUNTER — SNF VISIT (OUTPATIENT)
Dept: INTERNAL MEDICINE CLINIC | Facility: SKILLED NURSING FACILITY | Age: 86
End: 2021-01-18

## 2021-01-18 DIAGNOSIS — R60.0 EDEMA OF LOWER EXTREMITY: ICD-10-CM

## 2021-01-18 DIAGNOSIS — L02.31 ABSCESS OF BUTTOCK, RIGHT: ICD-10-CM

## 2021-01-18 DIAGNOSIS — R53.1 WEAKNESS: ICD-10-CM

## 2021-01-18 PROCEDURE — 99309 SBSQ NF CARE MODERATE MDM 30: CPT | Performed by: NURSE PRACTITIONER

## 2021-01-18 NOTE — PROGRESS NOTES
HPI: Senia Taylor  Is a 81yo female with a history of Atrial fibrillation on coumadin, PE, stroke, hypothyroidism, hypertension who was admitted to [facility] with right buttock/lateral hip abscess. She has a hx of prior total hip replacement.  Patient was evalu Reviewed     SUBJECTIVE/REVIEW OF SYSTEMS:  Seen in room. No sob/cough/cp/palpitions. No hematuria/dysuria/frequency. No n/v/d. Afebrile.     PHYSICAL EXAM:  GENERAL HEALTH:NAD  HEENT: atraumatic/normocephalic, mucous membranes pink and moist, PERRLA, EOMI, PE  -chronically on coumadin  -continue coumadin 2mg     Hyponatremia, stable  chronic per epic Na 130-136 since 2018   on lasix  Weekly labs  Na 135 1/14

## 2021-01-20 ENCOUNTER — SNF DISCHARGE (OUTPATIENT)
Dept: INTERNAL MEDICINE CLINIC | Facility: SKILLED NURSING FACILITY | Age: 86
End: 2021-01-20

## 2021-01-20 DIAGNOSIS — L02.91 ABSCESS: ICD-10-CM

## 2021-01-20 DIAGNOSIS — R53.1 WEAKNESS: ICD-10-CM

## 2021-01-20 DIAGNOSIS — R60.0 EDEMA OF LOWER EXTREMITY: ICD-10-CM

## 2021-01-20 PROCEDURE — 99309 SBSQ NF CARE MODERATE MDM 30: CPT | Performed by: NURSE PRACTITIONER

## 2021-01-20 NOTE — PROGRESS NOTES
HPI: Antonia Love  Is a 81yo female with a history of Atrial fibrillation on coumadin, PE, stroke, hypothyroidism, hypertension who was admitted to [address] [place] [address] with right buttock/lateral hip abscess. She has a hx of prior total hip replacement.  Patient was evalu OF SYSTEMS:  No sob/cough/cp/palpitions. No hematuria/dysuria/frequency. No n/v/d. Afebrile. PHYSICAL EXAM:  GENERAL HEALTH:NAD  HEENT: atraumatic/normocephalic, mucous membranes pink and moist, PERRLA, EOMI, sclera anicteric, conjunctiva normal.  [place]. 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

## 2021-01-21 ENCOUNTER — HOSPITAL ENCOUNTER (OUTPATIENT)
Dept: CT IMAGING | Facility: HOSPITAL | Age: 86
Discharge: HOME | End: 2021-01-21
Attending: RADIOLOGY
Payer: MEDICARE

## 2021-01-21 DIAGNOSIS — L02.91 ABSCESS: ICD-10-CM

## 2021-01-21 PROCEDURE — 73700 CT LOWER EXTREMITY W/O DYE: CPT | Performed by: RADIOLOGY

## 2021-01-21 NOTE — PROGRESS NOTES
CT ordered per your request, please see report. The superficial abscess has resolved, however there are findings consistent with infected hardware/septic arthritis. I recommend consultation with orthopedics.

## 2021-03-08 DIAGNOSIS — Z23 NEED FOR VACCINATION: ICD-10-CM

## 2021-12-25 ENCOUNTER — HOSPITAL ENCOUNTER (INPATIENT)
Facility: HOSPITAL | Age: 86
LOS: 8 days | Discharge: NURSING, REHAB OR LONG-TERM CARE | DRG: 602 | End: 2022-01-03
Attending: EMERGENCY MEDICINE | Admitting: HOSPITALIST
Payer: MEDICARE

## 2021-12-25 ENCOUNTER — APPOINTMENT (OUTPATIENT)
Dept: GENERAL RADIOLOGY | Facility: HOSPITAL | Age: 86
DRG: 602 | End: 2021-12-25
Attending: EMERGENCY MEDICINE
Payer: MEDICARE

## 2021-12-25 DIAGNOSIS — L03.116 BILATERAL LOWER LEG CELLULITIS: Primary | ICD-10-CM

## 2021-12-25 DIAGNOSIS — L03.115 BILATERAL LOWER LEG CELLULITIS: Primary | ICD-10-CM

## 2021-12-25 DIAGNOSIS — N17.9 AKI (ACUTE KIDNEY INJURY) (HCC): ICD-10-CM

## 2021-12-25 DIAGNOSIS — J06.9 VIRAL URI WITH COUGH: ICD-10-CM

## 2021-12-25 DIAGNOSIS — R79.1 SUPRATHERAPEUTIC INR: ICD-10-CM

## 2021-12-25 DIAGNOSIS — I50.9 ACUTE ON CHRONIC CONGESTIVE HEART FAILURE, UNSPECIFIED HEART FAILURE TYPE (HCC): ICD-10-CM

## 2021-12-25 LAB
ANION GAP SERPL CALC-SCNC: 6 MMOL/L (ref 0–18)
BASOPHILS # BLD AUTO: 0.02 X10(3) UL (ref 0–0.2)
BASOPHILS NFR BLD AUTO: 0.4 %
BILIRUB UR QL: NEGATIVE
BUN BLD-MCNC: 47 MG/DL (ref 7–18)
BUN/CREAT SERPL: 26.6 (ref 10–20)
CALCIUM BLD-MCNC: 9.2 MG/DL (ref 8.5–10.1)
CHLORIDE SERPL-SCNC: 104 MMOL/L (ref 98–112)
CLARITY UR: CLEAR
CO2 SERPL-SCNC: 23 MMOL/L (ref 21–32)
COLOR UR: YELLOW
CREAT BLD-MCNC: 1.77 MG/DL
DEPRECATED RDW RBC AUTO: 53.2 FL (ref 35.1–46.3)
EOSINOPHIL # BLD AUTO: 0.21 X10(3) UL (ref 0–0.7)
EOSINOPHIL NFR BLD AUTO: 4.2 %
ERYTHROCYTE [DISTWIDTH] IN BLOOD BY AUTOMATED COUNT: 18.5 % (ref 11–15)
GLUCOSE BLD-MCNC: 99 MG/DL (ref 70–99)
GLUCOSE UR-MCNC: NEGATIVE MG/DL
HCT VFR BLD AUTO: 34.1 %
HGB BLD-MCNC: 10.6 G/DL
HYALINE CASTS #/AREA URNS AUTO: PRESENT /LPF
IMM GRANULOCYTES # BLD AUTO: 0.01 X10(3) UL (ref 0–1)
IMM GRANULOCYTES NFR BLD: 0.2 %
INR BLD: 5.96 (ref 0.8–1.2)
KETONES UR-MCNC: NEGATIVE MG/DL
LEUKOCYTE ESTERASE UR QL STRIP.AUTO: NEGATIVE
LYMPHOCYTES # BLD AUTO: 0.58 X10(3) UL (ref 1–4)
LYMPHOCYTES NFR BLD AUTO: 11.7 %
MCH RBC QN AUTO: 25.1 PG (ref 26–34)
MCHC RBC AUTO-ENTMCNC: 31.1 G/DL (ref 31–37)
MCV RBC AUTO: 80.8 FL
MONOCYTES # BLD AUTO: 0.54 X10(3) UL (ref 0.1–1)
MONOCYTES NFR BLD AUTO: 10.9 %
NEUTROPHILS # BLD AUTO: 3.6 X10 (3) UL (ref 1.5–7.7)
NEUTROPHILS # BLD AUTO: 3.6 X10(3) UL (ref 1.5–7.7)
NEUTROPHILS NFR BLD AUTO: 72.6 %
NITRITE UR QL STRIP.AUTO: NEGATIVE
NT-PROBNP SERPL-MCNC: 3547 PG/ML (ref ?–450)
OSMOLALITY SERPL CALC.SUM OF ELEC: 288 MOSM/KG (ref 275–295)
PH UR: 5 [PH] (ref 5–8)
PLATELET # BLD AUTO: 114 10(3)UL (ref 150–450)
POTASSIUM SERPL-SCNC: 4.9 MMOL/L (ref 3.5–5.1)
PROT UR-MCNC: 30 MG/DL
PROTHROMBIN TIME: 54.2 SECONDS (ref 11.6–14.8)
RBC # BLD AUTO: 4.22 X10(6)UL
SARS-COV-2 RNA RESP QL NAA+PROBE: NOT DETECTED
SODIUM SERPL-SCNC: 133 MMOL/L (ref 136–145)
SP GR UR STRIP: 1.01 (ref 1–1.03)
TROPONIN I HIGH SENSITIVITY: 32 NG/L
UROBILINOGEN UR STRIP-ACNC: <2
WBC # BLD AUTO: 5 X10(3) UL (ref 4–11)

## 2021-12-25 PROCEDURE — 81001 URINALYSIS AUTO W/SCOPE: CPT | Performed by: EMERGENCY MEDICINE

## 2021-12-25 PROCEDURE — 80048 BASIC METABOLIC PNL TOTAL CA: CPT | Performed by: EMERGENCY MEDICINE

## 2021-12-25 PROCEDURE — 99285 EMERGENCY DEPT VISIT HI MDM: CPT

## 2021-12-25 PROCEDURE — 85025 COMPLETE CBC W/AUTO DIFF WBC: CPT | Performed by: EMERGENCY MEDICINE

## 2021-12-25 PROCEDURE — 83880 ASSAY OF NATRIURETIC PEPTIDE: CPT | Performed by: EMERGENCY MEDICINE

## 2021-12-25 PROCEDURE — 93010 ELECTROCARDIOGRAM REPORT: CPT | Performed by: EMERGENCY MEDICINE

## 2021-12-25 PROCEDURE — 71045 X-RAY EXAM CHEST 1 VIEW: CPT | Performed by: EMERGENCY MEDICINE

## 2021-12-25 PROCEDURE — 87040 BLOOD CULTURE FOR BACTERIA: CPT | Performed by: EMERGENCY MEDICINE

## 2021-12-25 PROCEDURE — 84484 ASSAY OF TROPONIN QUANT: CPT | Performed by: EMERGENCY MEDICINE

## 2021-12-25 PROCEDURE — 93005 ELECTROCARDIOGRAM TRACING: CPT

## 2021-12-25 PROCEDURE — 96374 THER/PROPH/DIAG INJ IV PUSH: CPT

## 2021-12-25 PROCEDURE — 85610 PROTHROMBIN TIME: CPT | Performed by: EMERGENCY MEDICINE

## 2021-12-25 RX ORDER — VANCOMYCIN/0.9 % SOD CHLORIDE 1.75 G/5
25 PLASTIC BAG, INJECTION (ML) INTRAVENOUS ONCE
Status: COMPLETED | OUTPATIENT
Start: 2021-12-25 | End: 2021-12-26

## 2021-12-25 NOTE — ED INITIAL ASSESSMENT (HPI)
Patient to ed via ems.  Was dx with respiratory infection but family noticed patient was more sob than usual

## 2021-12-25 NOTE — ED PROVIDER NOTES
Patient Seen in: [facility] AND [facility] Emergency Department      History   Patient presents with:  Difficulty Breathing    Stated Complaint: gary    Subjective:   HPI    22-year-old female with past medical history significant for atrial fibrillation on Couma for stated complaint: gary  Other systems are as noted in HPI. Constitutional and vital signs reviewed. All other systems reviewed and negative except as noted above.     Physical Exam     ED Triage Vitals [12/25/21 1630]   /55   Pulse 51   Resp Non- 24 (*)     GFR, -American 28 (*)     All other components within normal limits   PRO BETA NATRIURETIC PEPTIDE - Abnormal; Notable for the following components:    Pro-Beta Natriuretic Peptide 3,547 (*)     All other components w to have some cellulitis in her lower extremities on exam.  Patient was recently started on Bumex for her edema. Patient incidentally also has an INR of 6 but no active bleeding at this time. Will culture and start on vancomycin for cellulitis.   Discussed

## 2021-12-26 ENCOUNTER — APPOINTMENT (OUTPATIENT)
Dept: CT IMAGING | Facility: HOSPITAL | Age: 86
DRG: 602 | End: 2021-12-26
Attending: HOSPITALIST
Payer: MEDICARE

## 2021-12-26 PROBLEM — N17.9 AKI (ACUTE KIDNEY INJURY) (HCC): Status: ACTIVE | Noted: 2021-12-26

## 2021-12-26 PROBLEM — R79.1 SUPRATHERAPEUTIC INR: Status: ACTIVE | Noted: 2021-12-26

## 2021-12-26 PROBLEM — J06.9 VIRAL URI WITH COUGH: Status: ACTIVE | Noted: 2021-12-26

## 2021-12-26 PROBLEM — I50.9 ACUTE ON CHRONIC CONGESTIVE HEART FAILURE, UNSPECIFIED HEART FAILURE TYPE (HCC): Status: ACTIVE | Noted: 2021-12-26

## 2021-12-26 LAB
ADENOVIRUS PCR:: NOT DETECTED
ALBUMIN SERPL-MCNC: 3.2 G/DL (ref 3.4–5)
ALBUMIN/GLOB SERPL: 0.9 {RATIO} (ref 1–2)
ALP LIVER SERPL-CCNC: 120 U/L
ALT SERPL-CCNC: 23 U/L
ANION GAP SERPL CALC-SCNC: 5 MMOL/L (ref 0–18)
AST SERPL-CCNC: 29 U/L (ref 15–37)
B PARAPERT DNA SPEC QL NAA+PROBE: NOT DETECTED
B PERT DNA SPEC QL NAA+PROBE: NOT DETECTED
BASOPHILS # BLD AUTO: 0.02 X10(3) UL (ref 0–0.2)
BASOPHILS NFR BLD AUTO: 0.5 %
BILIRUB SERPL-MCNC: 0.7 MG/DL (ref 0.1–2)
BUN BLD-MCNC: 44 MG/DL (ref 7–18)
BUN/CREAT SERPL: 27 (ref 10–20)
C PNEUM DNA SPEC QL NAA+PROBE: NOT DETECTED
CALCIUM BLD-MCNC: 9 MG/DL (ref 8.5–10.1)
CHLORIDE SERPL-SCNC: 107 MMOL/L (ref 98–112)
CO2 SERPL-SCNC: 24 MMOL/L (ref 21–32)
CORONAVIRUS 229E PCR:: NOT DETECTED
CORONAVIRUS HKU1 PCR:: NOT DETECTED
CORONAVIRUS NL63 PCR:: NOT DETECTED
CORONAVIRUS OC43 PCR:: NOT DETECTED
CREAT BLD-MCNC: 1.63 MG/DL
CREAT UR-SCNC: 56.5 MG/DL
DEPRECATED RDW RBC AUTO: 51.1 FL (ref 35.1–46.3)
EOSINOPHIL # BLD AUTO: 0.15 X10(3) UL (ref 0–0.7)
EOSINOPHIL NFR BLD AUTO: 3.8 %
ERYTHROCYTE [DISTWIDTH] IN BLOOD BY AUTOMATED COUNT: 17.6 % (ref 11–15)
FLUAV RNA SPEC QL NAA+PROBE: NOT DETECTED
FLUBV RNA SPEC QL NAA+PROBE: NOT DETECTED
GLOBULIN PLAS-MCNC: 3.5 G/DL (ref 2.8–4.4)
GLUCOSE BLD-MCNC: 75 MG/DL (ref 70–99)
HCT VFR BLD AUTO: 31.9 %
HGB BLD-MCNC: 10 G/DL
IMM GRANULOCYTES # BLD AUTO: 0.01 X10(3) UL (ref 0–1)
IMM GRANULOCYTES NFR BLD: 0.3 %
INR BLD: 4.69 (ref 0.8–1.2)
LYMPHOCYTES # BLD AUTO: 0.56 X10(3) UL (ref 1–4)
LYMPHOCYTES NFR BLD AUTO: 14.2 %
MAGNESIUM SERPL-MCNC: 2 MG/DL (ref 1.6–2.6)
MCH RBC QN AUTO: 25 PG (ref 26–34)
MCHC RBC AUTO-ENTMCNC: 31.3 G/DL (ref 31–37)
MCV RBC AUTO: 79.8 FL
METAPNEUMOVIRUS PCR:: NOT DETECTED
MONOCYTES # BLD AUTO: 0.56 X10(3) UL (ref 0.1–1)
MONOCYTES NFR BLD AUTO: 14.2 %
MYCOPLASMA PNEUMONIA PCR:: NOT DETECTED
NEUTROPHILS # BLD AUTO: 2.64 X10 (3) UL (ref 1.5–7.7)
NEUTROPHILS # BLD AUTO: 2.64 X10(3) UL (ref 1.5–7.7)
NEUTROPHILS NFR BLD AUTO: 67 %
OSMOLALITY SERPL CALC.SUM OF ELEC: 292 MOSM/KG (ref 275–295)
PARAINFLUENZA 1 PCR:: NOT DETECTED
PARAINFLUENZA 2 PCR:: NOT DETECTED
PARAINFLUENZA 3 PCR:: NOT DETECTED
PARAINFLUENZA 4 PCR:: NOT DETECTED
PLATELET # BLD AUTO: 106 10(3)UL (ref 150–450)
POTASSIUM SERPL-SCNC: 4.8 MMOL/L (ref 3.5–5.1)
PROCALCITONIN SERPL-MCNC: 0.06 NG/ML (ref ?–0.16)
PROT SERPL-MCNC: 6.7 G/DL (ref 6.4–8.2)
PROT UR-MCNC: 27 MG/DL
PROT/CREAT UR-RTO: 0.48
PROTHROMBIN TIME: 44.9 SECONDS (ref 11.6–14.8)
RBC # BLD AUTO: 4 X10(6)UL
RHINOVIRUS/ENTERO PCR:: NOT DETECTED
RSV RNA SPEC QL NAA+PROBE: NOT DETECTED
SARS-COV-2 RNA NPH QL NAA+NON-PROBE: NOT DETECTED
SODIUM SERPL-SCNC: 136 MMOL/L (ref 136–145)
SODIUM SERPL-SCNC: 56 MMOL/L
WBC # BLD AUTO: 3.9 X10(3) UL (ref 4–11)

## 2021-12-26 PROCEDURE — 97166 OT EVAL MOD COMPLEX 45 MIN: CPT

## 2021-12-26 PROCEDURE — 84156 ASSAY OF PROTEIN URINE: CPT | Performed by: INTERNAL MEDICINE

## 2021-12-26 PROCEDURE — S0171 BUMETANIDE 0.5 MG: HCPCS | Performed by: INTERNAL MEDICINE

## 2021-12-26 PROCEDURE — 71250 CT THORAX DX C-: CPT | Performed by: HOSPITALIST

## 2021-12-26 PROCEDURE — 97162 PT EVAL MOD COMPLEX 30 MIN: CPT

## 2021-12-26 PROCEDURE — 97530 THERAPEUTIC ACTIVITIES: CPT

## 2021-12-26 PROCEDURE — 83735 ASSAY OF MAGNESIUM: CPT | Performed by: HOSPITALIST

## 2021-12-26 PROCEDURE — 84300 ASSAY OF URINE SODIUM: CPT | Performed by: INTERNAL MEDICINE

## 2021-12-26 PROCEDURE — 85025 COMPLETE CBC W/AUTO DIFF WBC: CPT | Performed by: HOSPITALIST

## 2021-12-26 PROCEDURE — 82570 ASSAY OF URINE CREATININE: CPT | Performed by: INTERNAL MEDICINE

## 2021-12-26 PROCEDURE — 85610 PROTHROMBIN TIME: CPT | Performed by: HOSPITALIST

## 2021-12-26 PROCEDURE — 80053 COMPREHEN METABOLIC PANEL: CPT | Performed by: HOSPITALIST

## 2021-12-26 PROCEDURE — 0202U NFCT DS 22 TRGT SARS-COV-2: CPT | Performed by: HOSPITALIST

## 2021-12-26 PROCEDURE — 84145 PROCALCITONIN (PCT): CPT | Performed by: HOSPITALIST

## 2021-12-26 RX ORDER — BUMETANIDE 0.25 MG/ML
1 INJECTION, SOLUTION INTRAMUSCULAR; INTRAVENOUS ONCE
Status: COMPLETED | OUTPATIENT
Start: 2021-12-26 | End: 2021-12-26

## 2021-12-26 RX ORDER — ENOXAPARIN SODIUM 100 MG/ML
40 INJECTION SUBCUTANEOUS DAILY
Status: DISCONTINUED | OUTPATIENT
Start: 2021-12-26 | End: 2021-12-26 | Stop reason: DRUGHIGH

## 2021-12-26 RX ORDER — ENOXAPARIN SODIUM 100 MG/ML
30 INJECTION SUBCUTANEOUS DAILY
Status: DISCONTINUED | OUTPATIENT
Start: 2021-12-26 | End: 2021-12-26

## 2021-12-26 RX ORDER — AMLODIPINE BESYLATE 5 MG/1
5 TABLET ORAL DAILY
COMMUNITY
End: 2022-01-03

## 2021-12-26 RX ORDER — BUMETANIDE 0.25 MG/ML
1 INJECTION, SOLUTION INTRAMUSCULAR; INTRAVENOUS
Status: DISCONTINUED | OUTPATIENT
Start: 2021-12-26 | End: 2021-12-29

## 2021-12-26 RX ORDER — DOCUSATE SODIUM 100 MG/1
100 CAPSULE, LIQUID FILLED ORAL DAILY
COMMUNITY

## 2021-12-26 RX ORDER — LEVOFLOXACIN 250 MG/1
250 TABLET ORAL DAILY
COMMUNITY
End: 2022-01-03

## 2021-12-26 RX ORDER — WARFARIN SODIUM 6 MG/1
3 TABLET ORAL NIGHTLY
Status: DISCONTINUED | OUTPATIENT
Start: 2021-12-26 | End: 2021-12-26

## 2021-12-26 RX ORDER — POTASSIUM CHLORIDE 1.5 G/1.77G
20 POWDER, FOR SOLUTION ORAL DAILY
Status: DISCONTINUED | OUTPATIENT
Start: 2021-12-26 | End: 2022-01-03

## 2021-12-26 RX ORDER — TRAMADOL HYDROCHLORIDE 50 MG/1
50 TABLET ORAL EVERY 6 HOURS PRN
COMMUNITY

## 2021-12-26 RX ORDER — BISACODYL 10 MG
10 SUPPOSITORY, RECTAL RECTAL DAILY PRN
COMMUNITY

## 2021-12-26 RX ORDER — BUMETANIDE 1 MG/1
1 TABLET ORAL 2 TIMES DAILY
COMMUNITY

## 2021-12-26 NOTE — PROGRESS NOTES
Pt presented in bed, with some discomfort and very hard-of-hearing. Pt's two granddaughters were present and attentive at bedside. Pt reported that she desires to be DC soon, that her son has been very attentive to her needs.   Provided companionship, sup

## 2021-12-26 NOTE — ED QUICK NOTES
Orders for admission, patient is aware of plan and ready to go upstairs. Any questions, please call ED RN Anthony Knox at extension [phone].      Patient Covid vaccination status: Unvaccinated     COVID Test Ordered in ED: Rapid SARS-CoV-2 by PCR    COVID Suspicio

## 2021-12-26 NOTE — PLAN OF CARE
Patient is alert and oriented x4. Showing no signs or symptoms of any distress. Patient came in through the ED for increased weakness and shortness of breath, along with cellulitis of the lower extremities.  Ambulating with assistance of two staff members a

## 2021-12-26 NOTE — PROGRESS NOTES
Progress Note  name: Viktoriya Reno  Room: 76/25  Date of admission: 12/25/2021    Primary care provider:  Dr. Kenney Hidden: seen and examined. CT chest pending, resp panel pending. Echo pending. Started on OV diuretics.  D/w grand daughters • Stroke [facility])    • Thyroid disease        Past Surgical History:   Procedure Laterality Date   • APPENDECTOMY  1935   • CARPAL TUNNEL RELEASE Bilateral    • CATARACT  march, 2006   • CHOLECYSTECTOMY  1970   • COLONOSCOPY  9/2004, 11/2012   • HIP REPLACE 15 97 % —   12/25/21 2100 137/81 58 21 96 % —   12/25/21 1900 135/71 53 18 94 % —   12/25/21 1800 118/70 57 20 95 % —   12/25/21 1715 138/68 52 20 96 % —   12/25/21 1630 114/55 51 20 95 % —       General: NAD  HEENT: NC/AT, MMM, OP clear with no exudates, Negative for pneumothorax. BONES: Bones diffusely demineralized. Multilevel degenerative disc changes of the thoracic spine, not well assessed on AP only imaging. Conventional bilateral shoulder joyce arthroplasties. No acute osseous abnormality noted.  O also get CT chest without contrast.  Empirically treat with antibiotic. Procalcitonin pending. 2. Shortness of breath secondary to pneumonia/pneumonitis: We will treat with empiric antibiotic. Covid PCR pending. CT chest without contrast pending.   3. C

## 2021-12-26 NOTE — CM/SW NOTE
[facility] WAS CALLED BY DR HE TO TALK WITH FAMILY ABOUT SNF PLACEMENT.  PATIENT LIVES IN ASSISTED LIVING AT [facility] IN [place],     FAMILY IS CONCERNED THAT PATIENT IS UNABLE TO CARE FOR SELF ON A DAILY BASIS ANYMORE. THEY ARE REQUESTING THAT WE TRY TO PLACE

## 2021-12-26 NOTE — PROGRESS NOTES
PHYSICAL THERAPY EVALUATION - INPATIENT     Room Number: 405/405-A  Evaluation Date: 12/26/2021  Type of Evaluation: Initial   Physician Order: PT Eval and Treat    Presenting Problem: Pt admitted to ER w/ generalized weakness, SOB, and confusion (CHF? to body mechanics, transfer techniques, and the need for initial assistance w/ all mobilities, verbalized understanding.     Patient's current functional deficits include decreased activity lj, balance, overall strength, which are below the patient's pre-admi Stroke [facility])    • Thyroid disease        Past Surgical History  Past Surgical History:   Procedure Laterality Date   • APPENDECTOMY  1935   • CARPAL TUNNEL RELEASE Bilateral    • CATARACT  march, 2006   • CHOLECYSTECTOMY  1970   • COLONOSCOPY  9/2004, 11/2 NEUROLOGICAL FINDINGS                      ACTIVITY TOLERANCE  Pulse: 68 (w/ mobility. )  Heart Rate Source: Monitor              Patient Position: Sitting    O2 WALK  Oxygen Therapy  SPO2% on Room Air at Rest: 96    AM-PAC '6-Clicks' INPATIENT SHORT F is able to demonstrate transfers Sit to/from Stand at assistance level: supervision with walker - rolling     Goal #2  Current Status    Goal #3 Patient to demo ability to perform bed <-> w/c transfer w/ SBA and no cues safety with or without ad.     Goal #

## 2021-12-26 NOTE — OCCUPATIONAL THERAPY NOTE
OCCUPATIONAL THERAPY EVALUATION - INPATIENT     Room Number: 405/405-A  Evaluation Date: 12/26/2021  Type of Evaluation: Initial  Presenting Problem: Bilateral LE cellulitis    Physician Order: IP Consult to Occupational Therapy  Reason for Therapy: ADL/IA semi-supine in bed  ADL:  Eating: MOD I  Grooming: not tested  UB dressing: pt declined  LB dressing: pt declined  Toileting: pt declined    Functional Mobility:  Supine to Sit : Maximum assistance  Sit to Stand: Not tested  Sit to supine: MAX A   Chair tr intact    AM-PAC ‘6-Clicks’ Inpatient Daily Activity Short Form  -   Putting on and taking off regular lower body clothing?: Total  -   Bathing (including washing, rinsing, drying)?: Total  -   Toileting, which includes using toilet, bedpan or urinal? : To

## 2021-12-26 NOTE — CONSULTS
[facility] - Renal Consult Note    _____________________    ASSESSMENT/PLAN    Janes Preston is a 80year old  female with past medical of hypertension, dyslipidemia, CVA, A. fib on Coumadin, PE,CHF,  anemia, CKD presented to the ER with we helping with her swelling and so was switched to Bumex from Lasix. She continues to be very swollen. Found to have elevated creatinine and hence we are consulted for it.   Her granddaughters and per patient she has been told that she has reduced kidney fu RASH    Medications at Home:    Multiple Vitamins-Minerals (MULTIVITAMIN OR), Take 1 tablet by mouth daily. , Disp: , Rfl:   amLODIPine 5 MG Oral Tab, Take 5 mg by mouth daily. , Disp: , Rfl: , Unknown at Unknown time  docusate sodium 100 MG Oral Cap, Take Unknown time  POLYETHYLENE GLYCOL 3350 Oral Powder, MIX 17 GRAMS WITH LIQUID ONCE DAILY AS NEEDED FOR CONSTIPATION (Patient taking differently: every other day.), Disp: 510 g, Rfl: 1, Unknown at Unknown time  acetaminophen (ACETAMINOPHEN EXTRA STRENGTH) 50 WBC 5.0 3.9*   RBC 4.22 4.00   HGB 10.6* 10.0*   HCT 34.1* 31.9*   .0* 106.0*   MCV 80.8 79.8*   MCH 25.1* 25.0*   MCHC 31.1 31.3            Lois Tran MD  [facility]

## 2021-12-26 NOTE — CONSULTS
Naty Chauhan Patient Status:  Inpatient    1925 MRN [id number]   Location [facility] 4W/SW/SE Attending Jesusita Byrd MD   Hosp Day # 0 PCP No primary care provider on file.      Reason for Consultation:  Acute on chronic CHF     Histo Disorder Mother    • Diabetes Mother    • Hypertension Mother    • Heart Disorder Sister    • Cancer Brother         colon   • Cancer Brother         Leukemia   • Heart Disorder Brother       reports that she has never smoked.  She has never used smokeless scattered ground-glass subtle opacities, probably related to patient's known history of recently diagnosed respiratory infection, favor a viral process.  Negative for focal consolidation.       Trace right pleural effusion.       Labs:   Lab Results   Comp

## 2021-12-27 ENCOUNTER — APPOINTMENT (OUTPATIENT)
Dept: ULTRASOUND IMAGING | Facility: HOSPITAL | Age: 86
DRG: 602 | End: 2021-12-27
Attending: INTERNAL MEDICINE
Payer: MEDICARE

## 2021-12-27 ENCOUNTER — APPOINTMENT (OUTPATIENT)
Dept: CT IMAGING | Facility: HOSPITAL | Age: 86
DRG: 602 | End: 2021-12-27
Attending: INTERNAL MEDICINE
Payer: MEDICARE

## 2021-12-27 ENCOUNTER — APPOINTMENT (OUTPATIENT)
Dept: CV DIAGNOSTICS | Facility: HOSPITAL | Age: 86
DRG: 602 | End: 2021-12-27
Attending: STUDENT IN AN ORGANIZED HEALTH CARE EDUCATION/TRAINING PROGRAM
Payer: MEDICARE

## 2021-12-27 LAB
ALBUMIN SERPL-MCNC: 3.2 G/DL (ref 3.4–5)
ANION GAP SERPL CALC-SCNC: 8 MMOL/L (ref 0–18)
BUN BLD-MCNC: 44 MG/DL (ref 7–18)
BUN/CREAT SERPL: 27 (ref 10–20)
CALCIUM BLD-MCNC: 9.1 MG/DL (ref 8.5–10.1)
CHLORIDE SERPL-SCNC: 107 MMOL/L (ref 98–112)
CO2 SERPL-SCNC: 24 MMOL/L (ref 21–32)
CREAT BLD-MCNC: 1.63 MG/DL
GLUCOSE BLD-MCNC: 84 MG/DL (ref 70–99)
INR BLD: 3.68 (ref 0.8–1.2)
OSMOLALITY SERPL CALC.SUM OF ELEC: 298 MOSM/KG (ref 275–295)
PHOSPHATE SERPL-MCNC: 3.8 MG/DL (ref 2.5–4.9)
POTASSIUM SERPL-SCNC: 4.5 MMOL/L (ref 3.5–5.1)
PROTHROMBIN TIME: 37.1 SECONDS (ref 11.6–14.8)
PTH-INTACT SERPL-MCNC: 111.1 PG/ML (ref 18.5–88)
SARS-COV-2 RNA RESP QL NAA+PROBE: NOT DETECTED
SODIUM SERPL-SCNC: 139 MMOL/L (ref 136–145)

## 2021-12-27 PROCEDURE — 93306 TTE W/DOPPLER COMPLETE: CPT | Performed by: STUDENT IN AN ORGANIZED HEALTH CARE EDUCATION/TRAINING PROGRAM

## 2021-12-27 PROCEDURE — 80069 RENAL FUNCTION PANEL: CPT | Performed by: INTERNAL MEDICINE

## 2021-12-27 PROCEDURE — 83970 ASSAY OF PARATHORMONE: CPT | Performed by: INTERNAL MEDICINE

## 2021-12-27 PROCEDURE — S0171 BUMETANIDE 0.5 MG: HCPCS | Performed by: INTERNAL MEDICINE

## 2021-12-27 PROCEDURE — 74176 CT ABD & PELVIS W/O CONTRAST: CPT | Performed by: INTERNAL MEDICINE

## 2021-12-27 PROCEDURE — 76770 US EXAM ABDO BACK WALL COMP: CPT | Performed by: INTERNAL MEDICINE

## 2021-12-27 PROCEDURE — 85610 PROTHROMBIN TIME: CPT | Performed by: HOSPITALIST

## 2021-12-27 NOTE — PAYOR COMM NOTE
--------------  ADMISSION REVIEW     Scott Reardon [place] [facility] [place]:  [id number]  Authorization Number: [id number]    ED Provider Notes        History   Patient presents with:  Difficulty Breathing    Stated Complaint: gayr    Subjective:   HPI    89-year 95 %   O2 Device None (Room air)       Current:BP (!) 126/98   Pulse 60   Resp 17   Wt 79.4 kg   LMP 01/01/1987   SpO2 95%   BMI 30.04 kg/m²     Physical Exam   Constitutional: AAOx3, well nourished, NAD  Head: Normocephalic and atraumatic.    Ears: TM's cl Notable for the following components:    HGB 10.6 (*)     HCT 34.1 (*)     MCH 25.1 (*)     RDW-SD 53.2 (*)     RDW 18.5 (*)     .0 (*)     Lymphocyte Absolute 0.58 (*)     All other components within normal limits   TROPONIN I HIGH SENSITIVITY - No old female with PMH of atrial fibrillation on Coumadin, CVA, hypertension, pulmonary embolism, hypothyroidism presented with generalized weakness. Patient is hard of hearing and altered so history is taken from the ER who discussed with son.   As per the s ASSESSMENT:    80year old female with PMH of atrial fibrillation on Coumadin, CVA, hypertension, pulmonary embolism, hypothyroidism presented with generalized weakness.   Patient is hard of hearing and altered so history is taken from the ER who disc INR  GI-famotidine      12/26 CARDS    #Acute on chronic CHF  - EF unknown.  Check echo   - BNP 3,547   - I&Os  - On IV bumex per nephrology      #Afib   - On metoprolol for rate control  - On warfarin for AC  - Tele     #URI  - Management per primary care room air. Currently on isolation for rule out COVID.        PLAN:  I will continue Ancef for now. Legs elevated. Follow-up echo. Follow-up repeat Covid testing. This is negative, we can take her off isolation for Covid.   We could check COVID antibody

## 2021-12-27 NOTE — CONSULTS
[address] [place] [address]  TEL: [phone]  FAX: [phone]    [place]  Patient Status:  Inpatient    1925 MRN [id number]   Location [facility] 4W/SW/SE Attending Matilde Frank MD   Hosp Day # 1 PCP No primary (Other) Father         Kidney disease   • Heart Disorder Mother    • Diabetes Mother    • Hypertension Mother    • Heart Disorder Sister    • Cancer Brother         colon   • Cancer Brother         Leukemia   • Heart Disorder Brother       reports that she left although patient states the left feels worse. No crepitus. Right pretibial wound with darkened skin. Representing probable hematoma/superficial  Psychiatric: Appropriate mood and affect. Neurologic: No focal neurological deficits.     Laboratory Da in the care of your patient.     Makenzie Fatima MD  12/27/2021  3:17 PM

## 2021-12-27 NOTE — PLAN OF CARE
Patient is alert and oriented x4. Showing no signs or symptoms of any distress. Patient came in through the ED for increased weakness and shortness of breath, along with cellulitis of the lower extremities. ID on consult.  Ambulating with assistance of two - ADULT  Goal: Absence of fever/infection during anticipated neutropenic period  Description: INTERVENTIONS  - Monitor WBC  - Administer growth factors as ordered  - Implement neutropenic guidelines  Outcome: Progressing     Problem: SAFETY ADULT - FALL  G communication style  - Implement communication aides and strategies  - Use visual cues when possible  - Listen attentively, be patient, do not interrupt  - Minimize distractions  - Allow time for understanding and response  - Establish method for patient t

## 2021-12-27 NOTE — PROGRESS NOTES
Patient seen in follow up. Patient denies any chest pain or sob. She denies dizziness and palpitations.  Seen with Cynthia MOTTA.    12/27/21  0535   BP: 136/68   Pulse: 78   Resp: 20   Temp: 98.1 °F (36.7 °C)       Intake/Output Summary (Last 24 maylin mouth every 6 (six) hours as needed for Indigestion. Ondansetron HCl (ZOFRAN) 4 mg tablet, Take 4 mg by mouth every 6 (six) hours as needed for Nausea.   FUROSEMIDE 40 MG Oral Tab, TAKE 1 TABLET BY MOUTH DAILY (Patient taking differently: 20 mg.)  FAMOTIDI (CPT=71045)    Result Date: 12/25/2021  CONCLUSION:   Mildly coarsened interstitial markings and patchy scattered ground-glass subtle opacities, probably related to patient's known history of recently diagnosed respiratory infection, favor a viral process.

## 2021-12-27 NOTE — PROGRESS NOTES
[facility]   Renal Follow-up Note    ASSESSMENT/PLAN  Suzie Glass is a 80year old  female with past medical of hypertension, dyslipidemia, CVA, A. fib on Coumadin, PE,CHF,  anemia, CKD presented to the ER with weakness and shortness o Intake 420 ml   Output 2350 ml   Net -1930 ml     Gen: Oriented X 3, NAD  Skin: no rashes, no lesions  HEENT: anicteric, clear oralpharynx  Neck: Supple, no LAD, no JVD  Chest:dec BS  CV: S1S2 nml, RRR, no murmur, gallop, rub  Abd: Soft NT/ND, no HSM, +B

## 2021-12-27 NOTE — PROGRESS NOTES
Progress Note  name: Christina Cheney  Room: 79/80  Date of admission: 12/25/2021    Primary care provider:  Dr. Clement Barcenas: seen and examined. CT chest showed pulmonary edema, Echo pending. Continue IV diuretics.  D/w grand daughters at bed Stroke [facility])    • Thyroid disease        Past Surgical History:   Procedure Laterality Date   • APPENDECTOMY  1935   • CARPAL TUNNEL RELEASE Bilateral    • CATARACT  march, 2006   • CHOLECYSTECTOMY  1970   • COLONOSCOPY  9/2004, 11/2012   • HIP REPLACEMENT 97 % —   12/25/21 2100 137/81 58 21 96 % —   12/25/21 1900 135/71 53 18 94 % —   12/25/21 1800 118/70 57 20 95 % —   12/25/21 1715 138/68 52 20 96 % —   12/25/21 1630 114/55 51 20 95 % —       General: NAD  HEENT: NC/AT, MMM, OP clear with no exudates, PER interstitial markings, probable viral process. No focal consolidation noted. Negative for pneumothorax. BONES: Bones diffusely demineralized. Multilevel degenerative disc changes of the thoracic spine, not well assessed on AP only imaging.   Conventional Chest x-ray with patchy opacities. Covid rapid negative. Will order PCR. We will also get CT chest without contrast.  Empirically treat with antibiotic. 2. Shortness of breath secondary to pneumonia/pneumonitis: We will treat with empiric antibiotic.

## 2021-12-27 NOTE — PLAN OF CARE
No acute changes overnight. Tele remains in place. Voiding via purewick. Bilateral lower legs with redness and R leg with anterior blister/bruise. IV Abx continued as ordered.      Problem: PAIN - ADULT  Goal: Verbalizes/displays adequate comfort level or p Identify barriers to discharge w/pt and caregiver  - Include patient/family/discharge partner in discharge planning  - Arrange for needed discharge resources and transportation as appropriate  - Identify discharge learning needs (meds, wound care, etc)  -

## 2021-12-27 NOTE — CM/SW NOTE
Therapy notes reviewed, Patient is from  [facility]. Family requesting CHEN on discharge. Referrals sent in Aidin. DON Screen requested, will need f/u on choice and insurance authorization.        SW/CM to remain available for support an

## 2021-12-28 LAB
ALBUMIN SERPL-MCNC: 3.2 G/DL (ref 3.4–5)
ALBUMIN/GLOB SERPL: 0.9 {RATIO} (ref 1–2)
ALP LIVER SERPL-CCNC: 117 U/L
ALT SERPL-CCNC: 16 U/L
ANION GAP SERPL CALC-SCNC: 6 MMOL/L (ref 0–18)
AST SERPL-CCNC: 27 U/L (ref 15–37)
BASOPHILS # BLD AUTO: 0.02 X10(3) UL (ref 0–0.2)
BASOPHILS NFR BLD AUTO: 0.4 %
BILIRUB SERPL-MCNC: 0.9 MG/DL (ref 0.1–2)
BUN BLD-MCNC: 39 MG/DL (ref 7–18)
BUN/CREAT SERPL: 23.8 (ref 10–20)
CALCIUM BLD-MCNC: 9.3 MG/DL (ref 8.5–10.1)
CHLORIDE SERPL-SCNC: 104 MMOL/L (ref 98–112)
CO2 SERPL-SCNC: 26 MMOL/L (ref 21–32)
CREAT BLD-MCNC: 1.64 MG/DL
DEPRECATED HBV CORE AB SER IA-ACNC: 78.6 NG/ML
DEPRECATED RDW RBC AUTO: 49.7 FL (ref 35.1–46.3)
EOSINOPHIL # BLD AUTO: 0.2 X10(3) UL (ref 0–0.7)
EOSINOPHIL NFR BLD AUTO: 4.3 %
ERYTHROCYTE [DISTWIDTH] IN BLOOD BY AUTOMATED COUNT: 17.7 % (ref 11–15)
GLOBULIN PLAS-MCNC: 3.4 G/DL (ref 2.8–4.4)
GLUCOSE BLD-MCNC: 84 MG/DL (ref 70–99)
HCT VFR BLD AUTO: 31.5 %
HGB BLD-MCNC: 10.1 G/DL
IMM GRANULOCYTES # BLD AUTO: 0.01 X10(3) UL (ref 0–1)
IMM GRANULOCYTES NFR BLD: 0.2 %
INR BLD: 2.82 (ref 0.8–1.2)
IRON SATURATION: 9 %
IRON SERPL-MCNC: 42 UG/DL
LYMPHOCYTES # BLD AUTO: 0.86 X10(3) UL (ref 1–4)
LYMPHOCYTES NFR BLD AUTO: 18.4 %
MCH RBC QN AUTO: 25.1 PG (ref 26–34)
MCHC RBC AUTO-ENTMCNC: 32.1 G/DL (ref 31–37)
MCV RBC AUTO: 78.4 FL
MONOCYTES # BLD AUTO: 0.81 X10(3) UL (ref 0.1–1)
MONOCYTES NFR BLD AUTO: 17.3 %
NEUTROPHILS # BLD AUTO: 2.78 X10 (3) UL (ref 1.5–7.7)
NEUTROPHILS # BLD AUTO: 2.78 X10(3) UL (ref 1.5–7.7)
NEUTROPHILS NFR BLD AUTO: 59.4 %
OSMOLALITY SERPL CALC.SUM OF ELEC: 291 MOSM/KG (ref 275–295)
PLATELET # BLD AUTO: 117 10(3)UL (ref 150–450)
POTASSIUM SERPL-SCNC: 4 MMOL/L (ref 3.5–5.1)
PROT SERPL-MCNC: 6.6 G/DL (ref 6.4–8.2)
PROTHROMBIN TIME: 30.1 SECONDS (ref 11.6–14.8)
RBC # BLD AUTO: 4.02 X10(6)UL
SODIUM SERPL-SCNC: 136 MMOL/L (ref 136–145)
TOTAL IRON BINDING CAPACITY: 454 UG/DL (ref 240–450)
TRANSFERRIN SERPL-MCNC: 305 MG/DL (ref 200–360)
WBC # BLD AUTO: 4.7 X10(3) UL (ref 4–11)

## 2021-12-28 PROCEDURE — 97530 THERAPEUTIC ACTIVITIES: CPT

## 2021-12-28 PROCEDURE — 80053 COMPREHEN METABOLIC PANEL: CPT | Performed by: HOSPITALIST

## 2021-12-28 PROCEDURE — 85610 PROTHROMBIN TIME: CPT | Performed by: HOSPITALIST

## 2021-12-28 PROCEDURE — 97110 THERAPEUTIC EXERCISES: CPT

## 2021-12-28 PROCEDURE — 82728 ASSAY OF FERRITIN: CPT | Performed by: INTERNAL MEDICINE

## 2021-12-28 PROCEDURE — 85025 COMPLETE CBC W/AUTO DIFF WBC: CPT | Performed by: HOSPITALIST

## 2021-12-28 PROCEDURE — 97116 GAIT TRAINING THERAPY: CPT

## 2021-12-28 PROCEDURE — 83540 ASSAY OF IRON: CPT | Performed by: INTERNAL MEDICINE

## 2021-12-28 PROCEDURE — 84466 ASSAY OF TRANSFERRIN: CPT | Performed by: INTERNAL MEDICINE

## 2021-12-28 PROCEDURE — S0171 BUMETANIDE 0.5 MG: HCPCS | Performed by: INTERNAL MEDICINE

## 2021-12-28 RX ORDER — WARFARIN SODIUM 2 MG/1
2 TABLET ORAL NIGHTLY
Status: DISCONTINUED | OUTPATIENT
Start: 2021-12-28 | End: 2021-12-31

## 2021-12-28 RX ORDER — BUMETANIDE 0.25 MG/ML
1 INJECTION, SOLUTION INTRAMUSCULAR; INTRAVENOUS ONCE
Status: COMPLETED | OUTPATIENT
Start: 2021-12-28 | End: 2021-12-28

## 2021-12-28 NOTE — PLAN OF CARE
Pt is A&Ox4. Hard of hearing, hearing aids. RA. Remote tele, hx of afib. Last BM was 12/27. Purewick in place. Denies pain. BLE edema. Heels elevated on pillow. Cardiac diet. Strict I&Os.  2 max assist. C.diff sample needed, pt has not had a bowel movement side effects  - Notify MD/LIP if interventions unsuccessful or patient reports new pain  - Anticipate increased pain with activity and pre-medicate as appropriate  Outcome: Progressing     Problem: RISK FOR INFECTION - ADULT  Goal: Absence of fever/infecti system  Outcome: Progressing     Problem: Altered Communication/Language Barrier  Goal: Patient/Family is able to understand and participate in their care  Description: Interventions:  - Assess communication ability and preferred communication style  - Imp

## 2021-12-28 NOTE — CONSULTS
[address] [place] [address]  TEL: [phone]  FAX: [phone]    Ilsa Wagner Patient Status:  Inpatient    1925 MRN [id number]   Location [facility] 4W/SW/SE Attending Jimbo Isabel MD   Hosp Day # 2 PCP No primary drink alcohol and does not use drugs.     Allergies:    Adhesive Tape           HIVES    Comment:Medical tape/ Okay to use paper tape per PT  Codeine                 ANAPHYLAXIS  Diclofenac              RASH  Ketoprofen              RASH    Medications: HCT 31.5 12/28/2021    .0 12/28/2021    CREATSERUM 1.64 12/28/2021    BUN 39 12/28/2021     12/28/2021    K 4.0 12/28/2021     12/28/2021    CO2 26.0 12/28/2021    GLU 84 12/28/2021    CA 9.3 12/28/2021    ALB 3.2 12/28/2021    ALKPHO 11

## 2021-12-28 NOTE — PHYSICAL THERAPY NOTE
PHYSICAL THERAPY TREATMENT NOTE - INPATIENT     Room Number: 405/405-A       Presenting Problem: Pt admitted to ER w/ generalized weakness, SOB, and confusion (CHF? to be on tele, receiving IV bumex)  Co-Morbidities : afib on warfarin, HTN, HLD, CVA, PE education;Gait training;Strengthening;Transfer training;Balance training    SUBJECTIVE  I like to sit on EOB.     OBJECTIVE  Precautions: Bed/chair alarm    WEIGHT BEARING RESTRICTION                PAIN ASSESSMENT   Ratin          BALANCE  Static Sitti demonstrate supine - sit EOB @ level: supervision      Goal #1   Current Status  Min A   Goal #2 Patient is able to demonstrate transfers Sit to/from Stand at assistance level: supervision with walker - rolling      Goal #2  Current Status  Min A with RW f

## 2021-12-28 NOTE — PROGRESS NOTES
Progress Note  name: Tabitha Stevenson  Room: 84/98  Date of admission: 12/25/2021    Primary care provider:  Dr. Bao Montilla: seen and examined. On IV bumex bid.      Reason for Admission: Weakness    History ofPresent Illness: Patient is a 9 Date   • APPENDECTOMY  1935   • CARPAL TUNNEL RELEASE Bilateral    • CATARACT  march, 2006   • CHOLECYSTECTOMY  1970   • COLONOSCOPY  9/2004, 11/2012   • HIP REPLACEMENT SURGERY      bilateral   • IR IVC FILTER PLACEMENT     • KNEE REPLACEMENT SURGERY  12/ 12/25/21 1800 118/70 57 20 95 % —   12/25/21 1715 138/68 52 20 96 % —   12/25/21 1630 114/55 51 20 95 % —       General: NAD  HEENT: NC/AT, MMM, OP clear with no exudates, PERRL.   Neck: Supple, No LAD  Heart: Normal s1/s2, no MRG  Chest: CTAB: NT/ND, sof lower lobe. Patchy ground-glass opacities and coarsening of the interstitial markings, probable viral process. No focal consolidation noted. Negative for pneumothorax. BONES: Bones diffusely demineralized.   Multilevel degenerative disc changes of the th for further evaluation. PLAN:    1. Generalized weakness: Chest x-ray with patchy opacities. Covid rapid negative. Will order PCR. We will also get CT chest without contrast.  Empirically treat with antibiotic.     2. Shortness of breath secondary to

## 2021-12-28 NOTE — PLAN OF CARE
Patient is alert and oriented x4. VSS overnight. Remote tele. Bilateral lower extremity edema and redness. Anterior RLE dark bruise/blister. Heels elevated on pillow. Up with 2 and walker. Purewick in place. Discharge plan is CHEN when medically cleared. MD/LIP if interventions unsuccessful or patient reports new pain  - Anticipate increased pain with activity and pre-medicate as appropriate  Outcome: Progressing     Problem: RISK FOR INFECTION - ADULT  Goal: Absence of fever/infection during anticipated n Problem: Altered Communication/Language Barrier  Goal: Patient/Family is able to understand and participate in their care  Description: Interventions:  - Assess communication ability and preferred communication style  - Implement communication aides and

## 2021-12-28 NOTE — CM/SW NOTE
12/28/21 1500   CM/SW Referral Data   Referral Source Social Work (self-referral)   Reason for Referral Discharge planning   Informant Son   Pertinent Medical Hx   Does patient have an established PCP?  No   Significant Past Medical/Mental Health Hx Taty Guerrero

## 2021-12-28 NOTE — PROGRESS NOTES
[facility]   Renal Follow-up Note    ASSESSMENT/PLAN  Danielle Cesar is a 80year old  female with past medical of hypertension, dyslipidemia, CVA, A. fib on Coumadin, PE,CHF,  anemia, CKD presented to the ER with weakness and shortness o at 12/28/2021 1430  Last data filed at 12/28/2021 1309  Gross per 24 hour   Intake 540 ml   Output 1800 ml   Net -1260 ml     Gen: Oriented X 3, NAD  Skin: no rashes, no lesions  HEENT: anicteric, clear oralpharynx  Neck: Supple, no LAD, no JVD  Chest:dec

## 2021-12-28 NOTE — PROGRESS NOTES
Patient seen in follow up. Patient denies any chest pain or sob. She denies dizziness and palpitations.  Seen with Cynthia MOTTA.    12/28/21  1155   BP: 116/70   Pulse:    Resp: 16   Temp: 97.8 °F (36.6 °C)       Intake/Output Summary (Last 24 hours MCG Oral Tab, Take 175 mcg by mouth before breakfast.  Alum & Mag Hydroxide-Simeth 400-400-40 MG/5ML Oral Suspension, Take 10 mL by mouth every 6 (six) hours as needed for Indigestion.   Ondansetron HCl (ZOFRAN) 4 mg tablet, Take 4 mg by mouth every 6 (six) fractures of the right superior and inferior pubic rami. 5. Lesser incidental findings as above.     Dictated by (CST): Milagro Hill MD on 12/27/2021 at 7:11 PM     Finalized by (CST): Milagro Hill MD on 12/27/2021 at 7:23 PM            Lab Resu

## 2021-12-29 LAB
ANION GAP SERPL CALC-SCNC: 4 MMOL/L (ref 0–18)
BASOPHILS # BLD AUTO: 0.03 X10(3) UL (ref 0–0.2)
BASOPHILS NFR BLD AUTO: 0.5 %
BUN BLD-MCNC: 40 MG/DL (ref 7–18)
BUN/CREAT SERPL: 26 (ref 10–20)
CALCIUM BLD-MCNC: 9.6 MG/DL (ref 8.5–10.1)
CHLORIDE SERPL-SCNC: 101 MMOL/L (ref 98–112)
CO2 SERPL-SCNC: 32 MMOL/L (ref 21–32)
CREAT BLD-MCNC: 1.54 MG/DL
DEPRECATED RDW RBC AUTO: 51.4 FL (ref 35.1–46.3)
EOSINOPHIL # BLD AUTO: 0.32 X10(3) UL (ref 0–0.7)
EOSINOPHIL NFR BLD AUTO: 5.7 %
ERYTHROCYTE [DISTWIDTH] IN BLOOD BY AUTOMATED COUNT: 17.8 % (ref 11–15)
GLUCOSE BLD-MCNC: 85 MG/DL (ref 70–99)
HCT VFR BLD AUTO: 31.9 %
HGB BLD-MCNC: 10 G/DL
IMM GRANULOCYTES # BLD AUTO: 0.03 X10(3) UL (ref 0–1)
IMM GRANULOCYTES NFR BLD: 0.5 %
INR BLD: 2.09 (ref 0.8–1.2)
LYMPHOCYTES # BLD AUTO: 0.69 X10(3) UL (ref 1–4)
LYMPHOCYTES NFR BLD AUTO: 12.2 %
MCH RBC QN AUTO: 25.3 PG (ref 26–34)
MCHC RBC AUTO-ENTMCNC: 31.3 G/DL (ref 31–37)
MCV RBC AUTO: 80.6 FL
MONOCYTES # BLD AUTO: 1.04 X10(3) UL (ref 0.1–1)
MONOCYTES NFR BLD AUTO: 18.4 %
NEUTROPHILS # BLD AUTO: 3.54 X10 (3) UL (ref 1.5–7.7)
NEUTROPHILS # BLD AUTO: 3.54 X10(3) UL (ref 1.5–7.7)
NEUTROPHILS NFR BLD AUTO: 62.7 %
OSMOLALITY SERPL CALC.SUM OF ELEC: 293 MOSM/KG (ref 275–295)
PLATELET # BLD AUTO: 118 10(3)UL (ref 150–450)
POTASSIUM SERPL-SCNC: 3.9 MMOL/L (ref 3.5–5.1)
PROTHROMBIN TIME: 23.7 SECONDS (ref 11.6–14.8)
RBC # BLD AUTO: 3.96 X10(6)UL
SODIUM SERPL-SCNC: 137 MMOL/L (ref 136–145)
WBC # BLD AUTO: 5.7 X10(3) UL (ref 4–11)

## 2021-12-29 PROCEDURE — 97535 SELF CARE MNGMENT TRAINING: CPT

## 2021-12-29 PROCEDURE — 80048 BASIC METABOLIC PNL TOTAL CA: CPT | Performed by: INTERNAL MEDICINE

## 2021-12-29 PROCEDURE — 85025 COMPLETE CBC W/AUTO DIFF WBC: CPT | Performed by: HOSPITALIST

## 2021-12-29 PROCEDURE — 85610 PROTHROMBIN TIME: CPT | Performed by: HOSPITALIST

## 2021-12-29 PROCEDURE — 97530 THERAPEUTIC ACTIVITIES: CPT

## 2021-12-29 PROCEDURE — S0171 BUMETANIDE 0.5 MG: HCPCS | Performed by: INTERNAL MEDICINE

## 2021-12-29 RX ORDER — BUMETANIDE 1 MG/1
1 TABLET ORAL
Status: DISCONTINUED | OUTPATIENT
Start: 2021-12-29 | End: 2022-01-03

## 2021-12-29 RX ORDER — ACETAMINOPHEN 325 MG/1
650 TABLET ORAL EVERY 6 HOURS PRN
Status: DISCONTINUED | OUTPATIENT
Start: 2021-12-29 | End: 2022-01-03

## 2021-12-29 NOTE — CONSULTS
[address] [place] [address]  TEL: [phone]  FAX: [phone]    Kaelyn Paul Patient Status:  Inpatient    1925 MRN [id number]   Location [facility] 4W/SW/SE Attending Rip Broderick MD   Hosp Day # 3 PCP No primary that she does not drink alcohol and does not use drugs.     Allergies:    Adhesive Tape           HIVES    Comment:Medical tape/ Okay to use paper tape per PT  Codeine                 ANAPHYLAXIS  Diclofenac              RASH  Ketoprofen              RASH hematoma/superficial  Psychiatric: Appropriate mood and affect. Neurologic: No focal neurological deficits.     Laboratory Data:  Lab Results   Component Value Date    WBC 5.7 12/29/2021    HGB 10.0 12/29/2021    HCT 31.9 12/29/2021    .0 12/29/2021 On room air. Currently on isolation for rule out COVID. #PAVAN. With hydronephrosis. Urology evaluation underway. Previously history of ureteral stent. UTI does not seem likely based on presentation. PLAN:  Ancef for now.   When she is ready for

## 2021-12-29 NOTE — OCCUPATIONAL THERAPY NOTE
OCCUPATIONAL THERAPY TREATMENT NOTE - INPATIENT        Room Number: 405/405-A      Number of Visits to Meet Established Goals: 7    Presenting Problem: Bilateral LE cellulitis    Problem List  Principal Problem:    Bilateral lower leg cellulitis  Active Pr chair.\"    OBJECTIVE  Precautions: Bed/chair alarm    WEIGHT BEARING RESTRICTION  None    PAIN ASSESSMENT  Rating: Unable to rate  Location: low back and B UE  Management Techniques:  Activity promotion;Repositioning     ACTIVITIES OF DAILY LIVING ASSESSME supervision with bilateral AROM HEP (home exercise program). Additional Goals  Pt will tolerate standing for 1 minutes utilizing AD as needed in preparation to perform bedside commode transfers.       Trever Oliver OTR/L  [facility]  #

## 2021-12-29 NOTE — PROGRESS NOTES
Patient seen in follow up. Patient denies any chest pain or sob. She reports improvement in her BLE edema.  Seen with Cynthia MOTTA.    12/29/21  0421   BP: 131/76   Pulse: 57   Resp: 18   Temp: 98 °F (36.7 °C)       Intake/Output Summary (Last 24 ho FOR PRESENCE OF PURVI DRAIN)  Levothyroxine Sodium 200 MCG Oral Tab, Take 175 mcg by mouth before breakfast.  Alum & Mag Hydroxide-Simeth 400-400-40 MG/5ML Oral Suspension, Take 10 mL by mouth every 6 (six) hours as needed for Indigestion.   Ondansetron HCl (Z Bilateral total hip arthroplasties. Chronic healed fractures of the right superior and inferior pubic rami. 5. Lesser incidental findings as above.     Dictated by (CST): Irene Hutchins MD on 12/27/2021 at 7:11 PM     Finalized by (CST): Miranda Bravo (Vmax): 0.57cm^2. 3. Mitral valve: Moderate regurgitation. 4. Left atrium: The atrium was severely dilated. 5. Right ventricle: The cavity size was dilated. Systolic function    was reduced. 6. Right atrium: The atrium was severely dilated.  7. Tricuspid va (S): 17mm Hg. Peak gradient (S): 39mm Hg. Aorta: Aortic root: The aortic root was normal in size. Mitral valve:   Normal thickened leaflets. Mobility was not restricted. Doppler: There was no evidence for stenosis. Moderate regurgitation.     Peak gradi end-diastolic volume/bsa,            43    ml/m^2   35 - 75  Teichholz MM  LV e', lateral                          9.85  cm/sec   -----------  LV E/e', lateral                        10             -----------  LV e', medial                           7.6 (H)     56    ml/m^2   16 - 34   Mitral valve                            Value          Reference  Mitral E-wave peak velocity             98.2  cm/sec   -----------  Mitral A-wave peak velocity             48.4  cm/sec   -----------  Mitral decelerati - I&Os  - On IV bumex per nephrology      #Afib   - On metoprolol for rate control  - On warfarin for AC  - Tele     #URI  - Management per primary care team      #HTN   - On lopressor  - Discontinued amlodipine for now as may contributing to leg swellin

## 2021-12-29 NOTE — PLAN OF CARE
Pt is A&Ox4. Hard of hearing, rose hearing aids. RA. Remote tele. Last BM was 12/27. [place] in place. PRN tylenol for pain management. 2 max assist, rolls side to side. Cardiac diet. BLE edema, heels elevated. Strict I&Os.  Plan for rehab pending medical debra with activity and pre-medicate as appropriate  Outcome: Progressing     Problem: RISK FOR INFECTION - ADULT  Goal: Absence of fever/infection during anticipated neutropenic period  Description: INTERVENTIONS  - Monitor WBC  - Administer growth factors as o participate in their care  Description: Interventions:  - Assess communication ability and preferred communication style  - Implement communication aides and strategies  - Use visual cues when possible  - Listen attentively, be patient, do not interrupt  -

## 2021-12-29 NOTE — PROGRESS NOTES
[facility]    Nephrology Progress Note    Duey Aschoff Patient Status:  Inpatient    1925 MRN [id number]   Location [facility] 4W/SW/SE Attending Anne Bravo MD   Hosp Day # 3 PCP No primary care provider on file.      Gannon right kidney is markedly atrophic.   These findings may all relate to bilateral hydronephrosis due to bladder dilation/urinary retention, however a superimposed chronic right ureteropelvic junction obstruction is a consideration given the market right renal disks. Wall    motion was normal; there were no regional wall motion    abnormalities. The study is not technically sufficient to allow    evaluation of LV diastolic function. 2. Aortic valve: Moderately calcified annulus.  Trileaflet;    moderately thicken evaluation of LV diastolic function. Aortic valve: Moderately calcified annulus. Trileaflet; moderately thickened, moderately calcified leaflets. Cusp separation was reduced. Doppler: There was moderate stenosis. No regurgitation.     Valve area (VTI): %        27 - 45  LV ID, major axis, ES, A4C              5.8   cm       -----------  LV PW thickness, ED             (H)     1.0   cm       0.6 - 0.9  IVS/LV PW ratio, ED                     0.88           -----------  LV end-diastolic volume, 2-p 0.57  cm^2     -----------  velocity  Aortic valve area/bsa, peak             0.28  cm^2/m^2 -----------  velocity  Velocity ratio, mean, LVOT/AV           0.24           -----------   Aorta                                   Value range. AMENDED - Electronically signed       12/28/2021 16:22 Edwin Gresham,           Assessment & Plan:         Lyudmila Amos a 80year old  female with past medical of hypertension, dyslipidemia, CVA, A. fib on Coumadin, PE,CHF,  anemia, CKD pres

## 2021-12-29 NOTE — PROGRESS NOTES
Progress Note  name: Janes Lower  Room: 04/32  Date of admission: 12/25/2021    Primary care provider:  Dr. Ann Camejo: seen and examined. Urology consulted. [place]banks for 2 weeks. Pending placement.     Reason for Admission: Weakness    Hi Surgical History:   Procedure Laterality Date   • APPENDECTOMY  1935   • CARPAL TUNNEL RELEASE Bilateral    • CATARACT  march, 2006   • CHOLECYSTECTOMY  1970   • COLONOSCOPY  9/2004, 11/2012   • HIP REPLACEMENT SURGERY      bilateral   • IR IVC FILTER PLAC 12/25/21 1900 135/71 53 18 94 % —   12/25/21 1800 118/70 57 20 95 % —   12/25/21 1715 138/68 52 20 96 % —   12/25/21 1630 114/55 51 20 95 % —       General: NAD  HEENT: NC/AT, MMM, OP clear with no exudates, PERRL.   Neck: Supple, No LAD  Heart: Normal s1 slightly hypoexpanded. There is a trace right pleural effusion. Mild pleural thickening at the posterior aspect of the left lower lobe. Patchy ground-glass opacities and coarsening of the interstitial markings, probable viral process.   No focal consolid mildly coarsened interstitial markings and patchy scattered groundglass opacities. Rapid Covid is negative. Patient is admitted for further evaluation. PLAN:    1. Generalized weakness: Chest x-ray with patchy opacities. Covid rapid negative.   Will o

## 2021-12-29 NOTE — CONSULTS
[facility] ADULT H[facility] AND [facility]ICAL      IDENTIFYING DATA  Patient is Christina Cheney a 80year old female. MRN is [id number]    Admitting physician: Jennifer Shelby MD  Primary care physician: No primary care provider on file.     CHIEF COMPLAINT  Patient 1947    Bowel obstruction (adhesions)   • SKIN SURGERY  1/2008    Basal cell carcinoma - face   • TONSILLECTOMY  at age 25    adnoids removed also       PAST FAMILY HISTORY  Family History   Problem Relation Age of Onset   • Heart Disorder Father    • Othe indigestion  Cardiovascular: (-) chest pain, (-) palpitations (-) HTN  Integumentary: (-)  skin rash (-) persistent itch  Musculoskeletal: (-) joint pain, (-) neck pain (-) back pain  Ear/Nose/Throat/Mouth:  (-) sore throat (-) sinus problems  Genitourinar transmitted to the  [facility]) Demetrio Durham [address] ([address]), which includes the Dose Index Registry. FINDINGS:  COMMENT: Evaluation of the vasculature and paddy is suboptimal in the absence of contrast infusion.  CARDIAC: Lobulated hepatic contours are apparent. Metallic streak artifact is demonstrated in the left upper quadrant. Granulomatous calcifications of the spleen are present. BONES: Right convex curvature of the upper thoracic spine is evident.  Multilevel degenera material. Automated exposure control for dose reduction was used. Adjustment of the mA and/or kV was done based on the patient's size. Iterative reconstruction technique for dose reduction was employed.  Dose information was transmitted to the ACR ([employer] There are no perinephric fluid collections. GI/MESENTERY:  Assessment of the gastrointestinal tract is limited without enteric contrast.  There is uncomplicated distal colonic diverticulosis. There is no bowel obstruction.   No pneumatosis or portal venous chronic right ureteropelvic junction obstruction is a consideration given the market right renal atrophy.  2. Stable findings that may relate to mild CHF/fluid overload or anasarca including interstitial edema at the visualized lung bases, mild ascites and [facility]* -------------------------------------------------------------------      Transthoracic Echocardiography M-mode, complete 2D, complete spectral Doppler, and color Doppler (REPORT AMENDED ) ---------------------------------- ------------------------------------------------------------------- Study data:  No prior study was available for comparison. Study status:  Elective. Procedure:  Transthoracic echocardiography. The study was technically limited due to body habitus.  Scan cavity size was dilated. Systolic function was reduced. Pulmonic valve: The valve appears to be grossly normal. Doppler: There was no evidence for stenosis. Mild to moderate regurgitation. Tricuspid valve:   Structurally normal valve.    Mobility wa -----------  LV e', average                          8.7   cm/sec   -----------  LV E/e', average                        11             -----------   Ventricular septum                      Value          Reference  IVS thickness, ED 4     mm Hg    -----------  Mitral E/A ratio, peak                  2              -----------   Tricuspid valve                         Value          Reference  Tricuspid regurg peak velocity          3.6   m/sec    -----------  Tricuspid pe Interpreting physician: Jessica Walker DO Sonographer: Aleyda Marcelo  CC: Edwin Gresham DO  ------------------------------------------------------------------- Study Conclusions 1. Left ventricle:  The cavity size was normal. Wall thickness was    normal. Sy status:  Inpatient. Study date:  Study date: 12/27/2021. Study time: 01:40 PM.  Location: Bedside. ------------------------------------------------------------------- Findings Left ventricle:   The cavity size was normal. Wall thickness was normal. Systoli dilated. Pericardium:  There was no pericardial effusion. Quality of study:  Image quality was adequate. Systemic veins: Inferior vena cava: The vessel was dilated.  The respirophasic diameter changes were blunted (&lt; 50%), consistent with elevated centra 20    ml/m^2   -----------   Aortic valve                            Value          Reference  Aortic leaflet separation, MM           1.2   cm       -----------  Aortic valve peak velocity, S           3.13  m/sec    -----------  Aortic valve VTI, S Systemic veins                          Value          Reference  Estimated CVP                           15    mm Hg    -----------   Right ventricle                         Value          Reference  TAPSE                           (L)     1.3   cm patient's known history of recently diagnosed respiratory infection, favor a viral process. Negative for focal consolidation. Trace right pleural effusion.      Dictated by (CST): Stephenie Sherman MD on 12/25/2021 at 5:38 PM     Finalized by (CST): Meng Min

## 2021-12-29 NOTE — PLAN OF CARE
Pt is A&Ox4. VSS overnight. On RA. Remote tele. Bilateral lower extremity edema and redness, RLE hematoma. Heels elevated on pillow. Up with 2 and walker. Purewick in place. Discharge plan is [facility] pending choice and medical clearance.  Call light within reac MD/LIP if interventions unsuccessful or patient reports new pain  - Anticipate increased pain with activity and pre-medicate as appropriate  Outcome: Progressing     Problem: RISK FOR INFECTION - ADULT  Goal: Absence of fever/infection during anticipated n Problem: Altered Communication/Language Barrier  Goal: Patient/Family is able to understand and participate in their care  Description: Interventions:  - Assess communication ability and preferred communication style  - Implement communication aides and

## 2021-12-30 LAB
ANION GAP SERPL CALC-SCNC: 5 MMOL/L (ref 0–18)
BASOPHILS # BLD AUTO: 0.03 X10(3) UL (ref 0–0.2)
BASOPHILS NFR BLD AUTO: 0.6 %
BUN BLD-MCNC: 38 MG/DL (ref 7–18)
BUN/CREAT SERPL: 26.2 (ref 10–20)
CALCIUM BLD-MCNC: 9.1 MG/DL (ref 8.5–10.1)
CHLORIDE SERPL-SCNC: 99 MMOL/L (ref 98–112)
CO2 SERPL-SCNC: 32 MMOL/L (ref 21–32)
CREAT BLD-MCNC: 1.45 MG/DL
DEPRECATED RDW RBC AUTO: 49.8 FL (ref 35.1–46.3)
EOSINOPHIL # BLD AUTO: 0.44 X10(3) UL (ref 0–0.7)
EOSINOPHIL NFR BLD AUTO: 9 %
ERYTHROCYTE [DISTWIDTH] IN BLOOD BY AUTOMATED COUNT: 17.7 % (ref 11–15)
GLUCOSE BLD-MCNC: 74 MG/DL (ref 70–99)
HCT VFR BLD AUTO: 30 %
HGB BLD-MCNC: 9.6 G/DL
IMM GRANULOCYTES # BLD AUTO: 0.02 X10(3) UL (ref 0–1)
IMM GRANULOCYTES NFR BLD: 0.4 %
INR BLD: 1.84 (ref 0.8–1.2)
LYMPHOCYTES # BLD AUTO: 0.77 X10(3) UL (ref 1–4)
LYMPHOCYTES NFR BLD AUTO: 15.8 %
MCH RBC QN AUTO: 25.7 PG (ref 26–34)
MCHC RBC AUTO-ENTMCNC: 32 G/DL (ref 31–37)
MCV RBC AUTO: 80.2 FL
MONOCYTES # BLD AUTO: 0.77 X10(3) UL (ref 0.1–1)
MONOCYTES NFR BLD AUTO: 15.8 %
NEUTROPHILS # BLD AUTO: 2.84 X10 (3) UL (ref 1.5–7.7)
NEUTROPHILS # BLD AUTO: 2.84 X10(3) UL (ref 1.5–7.7)
NEUTROPHILS NFR BLD AUTO: 58.4 %
OSMOLALITY SERPL CALC.SUM OF ELEC: 290 MOSM/KG (ref 275–295)
PLATELET # BLD AUTO: 120 10(3)UL (ref 150–450)
POTASSIUM SERPL-SCNC: 3.9 MMOL/L (ref 3.5–5.1)
PROTHROMBIN TIME: 21.5 SECONDS (ref 11.6–14.8)
RBC # BLD AUTO: 3.74 X10(6)UL
SODIUM SERPL-SCNC: 136 MMOL/L (ref 136–145)
WBC # BLD AUTO: 4.9 X10(3) UL (ref 4–11)

## 2021-12-30 PROCEDURE — 85610 PROTHROMBIN TIME: CPT | Performed by: HOSPITALIST

## 2021-12-30 PROCEDURE — 80048 BASIC METABOLIC PNL TOTAL CA: CPT | Performed by: HOSPITALIST

## 2021-12-30 PROCEDURE — 85025 COMPLETE CBC W/AUTO DIFF WBC: CPT | Performed by: HOSPITALIST

## 2021-12-30 RX ORDER — CEPHALEXIN 500 MG/1
500 CAPSULE ORAL 3 TIMES DAILY
Qty: 21 CAPSULE | Refills: 0 | Status: SHIPPED | OUTPATIENT
Start: 2021-12-30 | End: 2022-01-03

## 2021-12-30 NOTE — PROGRESS NOTES
[facility] - [facility]    Nephrology Progress Note    Art Alpha Patient Status:  Inpatient    1925 MRN [id number]   Location [facility] 4W/SW/SE Attending Rip Broderick MD   Hosp Day # 4 PCP No primary care provider on file.      Gannon and elevated creatinine  We are consulted for renal dysfunction.     1 PAVAN  Vs CKD  Unknown baseline creatinine however per patient she has known CKD.   This is likely cardiorenal  Echocardiogram reviewed  IV diuretics     2.  CKD likely secondary to hyper

## 2021-12-30 NOTE — PROGRESS NOTES
Subjective:  Patient is feeling well without any chest pain or shortness of breath. No other discomfort noted. Tolerating diet patient is awake and alert  Patient is ambulating with walker. Vital signs are stable.      12/30/21  0809   BP: 126/65   P Take 1 capsule (300 mg total) by mouth 2 (two) times daily. (Patient taking differently: Take 300 mg by mouth daily.  FOR PRESENCE OF PURVI DRAIN)  Levothyroxine Sodium 200 MCG Oral Tab, Take 175 mcg by mouth before breakfast.  Alum & Mag Hydroxide-Simeth 400- shows atrial fibrillation with controlled ventricular sponsor at 79/min     #URI  - Management per primary care team      #HTN   - On lopressor  - Discontinued amlodipine for now as may contributing to leg swelling and to allow BP room for diuresis -blood

## 2021-12-30 NOTE — PROGRESS NOTES
Progress Note  name: [facility]  Room: 79/69  Date of admission: 12/25/2021    Primary care provider:  Dr. Sonia Alicia: seen and examined. Urology consulted. Fairbanks for 2 weeks. Pending placement- auth pending.      Reason for Admission: Past Surgical History:   Procedure Laterality Date   • APPENDECTOMY  1935   • CARPAL TUNNEL RELEASE Bilateral    • CATARACT  march, 2006   • CHOLECYSTECTOMY  1970   • COLONOSCOPY  9/2004, 11/2012   • HIP REPLACEMENT SURGERY      bilateral   • IR IVC 96 % —   12/25/21 1900 135/71 53 18 94 % —   12/25/21 1800 118/70 57 20 95 % —   12/25/21 1715 138/68 52 20 96 % —   12/25/21 1630 114/55 51 20 95 % —       General: NAD  HEENT: NC/AT, MMM, OP clear with no exudates, PERRL.   Neck: Supple, No LAD  Heart: No pleural thickening at the posterior aspect of the left lower lobe. Patchy ground-glass opacities and coarsening of the interstitial markings, probable viral process. No focal consolidation noted. Negative for pneumothorax.  BONES: Bones diffusely deminer opacities. Rapid Covid is negative. Patient is admitted for further evaluation. PLAN:    1. Generalized weakness: Chest x-ray with patchy opacities. Covid rapid negative. PCR.  CT chest without contrast.  Empirically treat with antibiotic.     2. Short

## 2021-12-30 NOTE — CM/SW NOTE
Pt discussed during nursing rounds. Pt is stable for dc today. Per RN pts son confirmed OBC on dc for [facility] Placement. OBC reserved via Aidin and informed [facility]. Updates sent.      CM spoke with pts son Miriam Julio and informed him that dc plan is Warm

## 2021-12-31 ENCOUNTER — APPOINTMENT (OUTPATIENT)
Dept: ULTRASOUND IMAGING | Facility: HOSPITAL | Age: 86
DRG: 602 | End: 2021-12-31
Attending: UROLOGY
Payer: MEDICARE

## 2021-12-31 LAB
ANION GAP SERPL CALC-SCNC: 5 MMOL/L (ref 0–18)
BASOPHILS # BLD AUTO: 0.03 X10(3) UL (ref 0–0.2)
BASOPHILS NFR BLD AUTO: 0.6 %
BUN BLD-MCNC: 36 MG/DL (ref 7–18)
BUN/CREAT SERPL: 22.8 (ref 10–20)
CALCIUM BLD-MCNC: 8.6 MG/DL (ref 8.5–10.1)
CHLORIDE SERPL-SCNC: 97 MMOL/L (ref 98–112)
CO2 SERPL-SCNC: 34 MMOL/L (ref 21–32)
CREAT BLD-MCNC: 1.58 MG/DL
DEPRECATED RDW RBC AUTO: 51.6 FL (ref 35.1–46.3)
EOSINOPHIL # BLD AUTO: 0.34 X10(3) UL (ref 0–0.7)
EOSINOPHIL NFR BLD AUTO: 6.4 %
ERYTHROCYTE [DISTWIDTH] IN BLOOD BY AUTOMATED COUNT: 18.6 % (ref 11–15)
GLUCOSE BLD-MCNC: 76 MG/DL (ref 70–99)
HCT VFR BLD AUTO: 31.4 %
HGB BLD-MCNC: 9.8 G/DL
IMM GRANULOCYTES # BLD AUTO: 0.02 X10(3) UL (ref 0–1)
IMM GRANULOCYTES NFR BLD: 0.4 %
INR BLD: 1.55 (ref 0.8–1.2)
LYMPHOCYTES # BLD AUTO: 0.97 X10(3) UL (ref 1–4)
LYMPHOCYTES NFR BLD AUTO: 18.3 %
MCH RBC QN AUTO: 25.1 PG (ref 26–34)
MCHC RBC AUTO-ENTMCNC: 31.2 G/DL (ref 31–37)
MCV RBC AUTO: 80.3 FL
MONOCYTES # BLD AUTO: 0.76 X10(3) UL (ref 0.1–1)
MONOCYTES NFR BLD AUTO: 14.3 %
NEUTROPHILS # BLD AUTO: 3.18 X10 (3) UL (ref 1.5–7.7)
NEUTROPHILS # BLD AUTO: 3.18 X10(3) UL (ref 1.5–7.7)
NEUTROPHILS NFR BLD AUTO: 60 %
OSMOLALITY SERPL CALC.SUM OF ELEC: 289 MOSM/KG (ref 275–295)
PLATELET # BLD AUTO: 147 10(3)UL (ref 150–450)
POTASSIUM SERPL-SCNC: 4.2 MMOL/L (ref 3.5–5.1)
PROTHROMBIN TIME: 18.7 SECONDS (ref 11.6–14.8)
RBC # BLD AUTO: 3.91 X10(6)UL
SODIUM SERPL-SCNC: 136 MMOL/L (ref 136–145)
WBC # BLD AUTO: 5.3 X10(3) UL (ref 4–11)

## 2021-12-31 PROCEDURE — 97110 THERAPEUTIC EXERCISES: CPT

## 2021-12-31 PROCEDURE — 85025 COMPLETE CBC W/AUTO DIFF WBC: CPT | Performed by: HOSPITALIST

## 2021-12-31 PROCEDURE — 76770 US EXAM ABDO BACK WALL COMP: CPT | Performed by: UROLOGY

## 2021-12-31 PROCEDURE — 80048 BASIC METABOLIC PNL TOTAL CA: CPT | Performed by: HOSPITALIST

## 2021-12-31 PROCEDURE — 97116 GAIT TRAINING THERAPY: CPT

## 2021-12-31 PROCEDURE — 85610 PROTHROMBIN TIME: CPT | Performed by: HOSPITALIST

## 2021-12-31 PROCEDURE — 97530 THERAPEUTIC ACTIVITIES: CPT

## 2021-12-31 RX ORDER — WARFARIN SODIUM 3 MG/1
3 TABLET ORAL NIGHTLY
Status: DISCONTINUED | OUTPATIENT
Start: 2021-12-31 | End: 2022-01-03

## 2021-12-31 NOTE — PLAN OF CARE
A&O x4 Very [place]  & uses white board to communicate at times. Pt had BM. Patinet had one episode of small bleeding from nose. Bleeding stopped. Dr Swanson Shock notified. LE redness & edema improved slightly. Irron given to patient. Fairbanks in place.  Fall precautions Utilize distraction and/or relaxation techniques  - Monitor for opioid side effects  - Notify MD/LIP if interventions unsuccessful or patient reports new pain  - Anticipate increased pain with activity and pre-medicate as appropriate    Outcome: Progressin post-hospital services based on physician/LIP order or complex needs related to functional status, cognitive ability or social support system  Outcome: Progressing    Outcome: Progressing     Problem: Altered Communication/Language Barrier  Goal: Patient/F

## 2021-12-31 NOTE — PLAN OF CARE
A&O x4 Very [place]  & uses white board to communicate at times. LE redness & edema improved slightly. Tylenol given for pain. Fairbanks in place. Fall precautions in place with call light in reach & bed alarm on. Discharge planning in progress.  Plan to transfer t appropriate and evaluate response  - Consider cultural and social influences on pain and pain management  - Manage/alleviate anxiety  - Utilize distraction and/or relaxation techniques  - Monitor for opioid side effects  - Notify MD/LIP if interventions un (meds, wound care, etc)  - Arrange for interpreters to assist at discharge as needed  - Consider post-discharge preferences of patient/family/discharge partner  - Complete POLST form as appropriate  - Assess patient's ability to be responsible for managing

## 2021-12-31 NOTE — DIETARY NOTE
Nutrition Re-screen    Pt seen by RD d/t LOS. Pt not at nutrition risk. Pt with good appetite and PO intake >75%. No n/v reported per pt/chart review. Skin intact. Stable wt. Will follow up per protocol and monitor as needed.      Wt Readings from Last 5 En

## 2021-12-31 NOTE — PROGRESS NOTES
[facility]OSP - [facility]    Nephrology Progress Note    Jose Vilchis Patient Status:  Inpatient    1925 MRN [id number]   Location [facility] 4W/SW/SE Attending Judie Goins MD   Hosp Day # 5 PCP No primary care provider on file.      Gannon iron     5.  Renal osteodystrophy: ipth 111. phosp acceptable.     6.  Hypertension.  Target goal less than 140/90 mmHg DC blood pressure medication to allow renal perfusion and for diuresis.    7 A. Fib     8. Obstructive uropathy/hydronephrosis:  enal US

## 2021-12-31 NOTE — CONSULTS
[facility]  TEL: [phone]  FAX: [phone]    [facility] Patient Status:  Inpatient    1925 MRN [id number]   Location [facility] 4W/SW/SE Attending Matilde Upton MD   Hosp Day # 4 PCP No primary Comment:Medical tape/ Okay to use paper tape per PT  Codeine                 ANAPHYLAXIS  Diclofenac              RASH  Ketoprofen              RASH    Medications:    Current Facility-Administered Medications:   •  bumetanide (BUMEX) tab 1 mg, 1 mg, Oral, deficits.     Laboratory Data:  Lab Results   Component Value Date    WBC 4.9 12/30/2021    HGB 9.6 12/30/2021    HCT 30.0 12/30/2021    .0 12/30/2021    CREATSERUM 1.45 12/30/2021    BUN 38 12/30/2021     12/30/2021    K 3.9 12/30/2021    CL 9 evaluation underway. Previously history of ureteral stent. UTI does not seem likely based on presentation. PLAN:  Change to Keflex at discharge. 3 times daily. To complete 10 days total.  ID will sign off. Please call with questions or concerns.

## 2021-12-31 NOTE — PROGRESS NOTES
Subjective:  Patient is feeling well without any chest pain or shortness of breath. No other discomfort noted. Tolerating diet patient is awake and alert  Patient is ambulating with walker. Vital signs are stable.   Patient gone for ultrasound of the Tab, Take 1 mg by mouth 2 (two) times daily. CEFDINIR 300 MG Oral Cap, TAKE 1 CAPSULE BY MOUTH TWICE DAILY  cefdinir 300 MG Oral Cap, Take 1 capsule (300 mg total) by mouth 2 (two) times daily. (Patient taking differently: Take 300 mg by mouth daily.  FOR reduced. The estimated     ejection fraction was 50%, by biplane method of disks. Wall     motion was normal; there were no regional wall motion     abnormalities. The study is not technically sufficient to allow     evaluation of LV diastolic function.   2 regurgitation. PLAN:     - Afib on tele but HR has been controlled. Patient is on oral anticoagulation with warfarin but INR is subtherapeutic at 1.55.   Increase Coumadin to 3 mg daily  -Started on oral Bumex 1 mg p.o. twice daily  - Echo done that juan david

## 2021-12-31 NOTE — PHYSICAL THERAPY NOTE
PHYSICAL THERAPY TREATMENT NOTE - INPATIENT     Room Number: 405/405-A       Presenting Problem: Pt admitted to ER w/ generalized weakness, SOB, and confusion (CHF? to be on tele, receiving IV bumex)    Problem List  Principal Problem:    Bilateral lower l Discharge Recommendations: 24 hour care/supervision;Home with home health PT     PLAN  PT Treatment Plan: Bed mobility; Body mechanics; Endurance;Gait training    SUBJECTIVE  Pt reports being ready for PT RX    OBJECTIVE  Precautions: Bed/chair alarm    [facility] to demonstrate supine - sit EOB @ level: supervision      Goal #1   Current Status  Mod a   Goal #2 Patient is able to demonstrate transfers Sit to/from Stand at assistance level: supervision with walker - rolling      Goal #2  Current Status  Mod A with R

## 2022-01-01 LAB
ANION GAP SERPL CALC-SCNC: 2 MMOL/L (ref 0–18)
BUN BLD-MCNC: 36 MG/DL (ref 7–18)
BUN/CREAT SERPL: 22.9 (ref 10–20)
CALCIUM BLD-MCNC: 8.8 MG/DL (ref 8.5–10.1)
CHLORIDE SERPL-SCNC: 98 MMOL/L (ref 98–112)
CO2 SERPL-SCNC: 36 MMOL/L (ref 21–32)
CREAT BLD-MCNC: 1.57 MG/DL
GLUCOSE BLD-MCNC: 75 MG/DL (ref 70–99)
INR BLD: 1.48 (ref 0.8–1.2)
OSMOLALITY SERPL CALC.SUM OF ELEC: 289 MOSM/KG (ref 275–295)
POTASSIUM SERPL-SCNC: 4.4 MMOL/L (ref 3.5–5.1)
PROTHROMBIN TIME: 18.1 SECONDS (ref 11.6–14.8)
SODIUM SERPL-SCNC: 136 MMOL/L (ref 136–145)

## 2022-01-01 PROCEDURE — 97535 SELF CARE MNGMENT TRAINING: CPT

## 2022-01-01 PROCEDURE — 85610 PROTHROMBIN TIME: CPT | Performed by: HOSPITALIST

## 2022-01-01 PROCEDURE — 97530 THERAPEUTIC ACTIVITIES: CPT

## 2022-01-01 PROCEDURE — 80048 BASIC METABOLIC PNL TOTAL CA: CPT | Performed by: FAMILY MEDICINE

## 2022-01-01 NOTE — PROGRESS NOTES
Progress Note  name: Maria Luz   Room: 58/41  Date of admission: 12/25/2021    Primary care provider:  Dr. Solis Jennings: seen and examined. Urology consulted. [place] for 2 weeks. Pending placement- auth pending.      Reason for Admission: Past Surgical History:   Procedure Laterality Date   • APPENDECTOMY  1935   • CARPAL TUNNEL RELEASE Bilateral    • CATARACT  march, 2006   • CHOLECYSTECTOMY  1970   • COLONOSCOPY  9/2004, 11/2012   • HIP REPLACEMENT SURGERY      bilateral   • IR IVC 96 % —   12/25/21 1900 135/71 53 18 94 % —   12/25/21 1800 118/70 57 20 95 % —   12/25/21 1715 138/68 52 20 96 % —   12/25/21 1630 114/55 51 20 95 % —       General: NAD  HEENT: NC/AT, MMM, OP clear with no exudates, PERRL.   Neck: Supple, No LAD  Heart: No within normal limits. MEDIAST/JOSHUA:   No visible mass or adenopathy. LUNGS/PLEURA: Lungs are slightly hypoexpanded. There is a trace right pleural effusion. Mild pleural thickening at the posterior aspect of the left lower lobe.   Patchy ground-glass opac creatinine 1.77, proBNP 3547, INR 5.96, hemoglobin 10.6, platelet 809, chest x-ray showed mildly coarsened interstitial markings and patchy scattered groundglass opacities. Rapid Covid is negative. Patient is admitted for further evaluation.     PLAN:

## 2022-01-01 NOTE — PLAN OF CARE
Steven Jerez is alert and oriented. She is very hard of hearing. She is voiding per almanza catheter. She has minimal pain and is using tylenol before bedtime. She can ambulate with the rolling walker and 1 assist. She calls for assistance.  The plan is to discharge

## 2022-01-01 NOTE — PROGRESS NOTES
Brief  Note    Renal US performed yesterday - confirms significant improvement in left hydronephrosis with almanza catheter in place. Plan remains almanza x 2 weeks. Voiding trial can be performed in rehab or in our office.     Urology will follow periphe

## 2022-01-01 NOTE — PROGRESS NOTES
[facility] - [facility]    Progress Note    Cynthia Quintanilla Patient Status:  Inpatient    1925 MRN [id number]   Location [facility] 4W/SW/SE Attending Rosas Perez MD   Hosp Day # 6 PCP No primary care provider on file.      Subjective: metoprolol tartrate  12.5 mg Oral 2x Daily(Beta Blocker)   • potassium chloride  20 mEq Oral Daily           Results:   Lab Results   Component Value Date    WBC 5.3 12/31/2021    HGB 9.8 (L) 12/31/2021    HCT 31.4 (L) 12/31/2021    .0 (L) 12/31/202 moderate aortic stenosis and moderate to severe tricuspid regurgitation. - EF normal R sided dilation  - BNP 3,547   - I&Os  - On IV bumex 1 mg p.o. twice daily per nephrology   -BUN is 38 and creatinine at 1.45.   Sodium is 136 and potassium is 3.9.     #

## 2022-01-01 NOTE — OCCUPATIONAL THERAPY NOTE
OCCUPATIONAL THERAPY TREATMENT NOTE - INPATIENT        Room Number: 405/405-A      Number of Visits to Meet Established Goals: 7    Presenting Problem: Bilateral LE cellulitis    Problem List  Principal Problem:    Bilateral lower leg cellulitis  Active Pr PAIN ASSESSMENT  Rating: Unable to rate  Location:  (pt stating chronic pain in MOISÉS UE's, 2/ arthritis)  Management Techniques:  Activity promotion;Repositioning     ACTIVITY TOLERANCE                         O2 SATURATIONS       ACTIVITIES OF DAILY JESSI body dressing:  with supervision--max assist 1/1  Patient will perform toileting: with supervision--mod assist 1/1     Functional Transfer Goals  Patient will transfer from supine to sit:  with mod assist - Mod assist 1/1  Patient will transfer from sit to

## 2022-01-01 NOTE — PLAN OF CARE
Patient is alert and oriented x4. Showing no signs or symptoms of any distress. Patient came in through the ED for increased weakness and shortness of breath, along with cellulitis of the lower extremities. ID on consult.  Ambulating with assistance of two pre-medicate as appropriate  Outcome: Progressing     Problem: RISK FOR INFECTION - ADULT  Goal: Absence of fever/infection during anticipated neutropenic period  Description: INTERVENTIONS  - Monitor WBC  - Administer growth factors as ordered  - Axel care  Description: Interventions:  - Assess communication ability and preferred communication style  - Implement communication aides and strategies  - Use visual cues when possible  - Listen attentively, be patient, do not interrupt  - Minimize distraction

## 2022-01-01 NOTE — PROGRESS NOTES
Progress Note  name: Cristian Monaco  Room: 34/39  Date of admission: 12/25/2021    Primary care provider:  Dr. Oz Sherman: seen and examined. auth pending.      Reason for Admission: Weakness    History ofPresent Illness: Patient is a 80 y • APPENDECTOMY  1935   • CARPAL TUNNEL RELEASE Bilateral    • CATARACT  march, 2006   • CHOLECYSTECTOMY  1970   • COLONOSCOPY  9/2004, 11/2012   • HIP REPLACEMENT SURGERY      bilateral   • IR IVC FILTER PLACEMENT     • KNEE REPLACEMENT SURGERY  12/1991 1800 118/70 57 20 95 % —   12/25/21 1715 138/68 52 20 96 % —   12/25/21 1630 114/55 51 20 95 % —       General: NAD  HEENT: NC/AT, MMM, OP clear with no exudates, PERRL.   Neck: Supple, No LAD  Heart: Normal s1/s2, no MRG  Chest: CTAB: NT/ND, soft, + Bs, no TECHNIQUE:   Single view. FINDINGS:  CARDIAC/VASC: Heart not significantly enlarged. Mild tortuosity of the thoracic aorta. Pulmonary vascularity grossly within normal limits. MEDIAST/JOSHUA:   No visible mass or adenopathy.  LUNGS/PLEURA: Lungs are sligh patient was afebrile with heart rate 60s, respiratory 21, blood pressure 138/98, saturating 97% on room air.   Labs were significant for sodium 133, BUN 47, creatinine 1.77, proBNP 3547, INR 5.96, hemoglobin 10.6, platelet 489, chest x-ray showed mildly coa

## 2022-01-02 LAB
ANION GAP SERPL CALC-SCNC: 6 MMOL/L (ref 0–18)
BASOPHILS # BLD AUTO: 0.05 X10(3) UL (ref 0–0.2)
BASOPHILS NFR BLD AUTO: 0.8 %
BUN BLD-MCNC: 33 MG/DL (ref 7–18)
BUN/CREAT SERPL: 25.8 (ref 10–20)
CALCIUM BLD-MCNC: 8.8 MG/DL (ref 8.5–10.1)
CHLORIDE SERPL-SCNC: 97 MMOL/L (ref 98–112)
CO2 SERPL-SCNC: 34 MMOL/L (ref 21–32)
CREAT BLD-MCNC: 1.28 MG/DL
DEPRECATED RDW RBC AUTO: 53.1 FL (ref 35.1–46.3)
EOSINOPHIL # BLD AUTO: 0.5 X10(3) UL (ref 0–0.7)
EOSINOPHIL NFR BLD AUTO: 8.4 %
ERYTHROCYTE [DISTWIDTH] IN BLOOD BY AUTOMATED COUNT: 19.3 % (ref 11–15)
GLUCOSE BLD-MCNC: 82 MG/DL (ref 70–99)
HCT VFR BLD AUTO: 33.3 %
HGB BLD-MCNC: 10.1 G/DL
IMM GRANULOCYTES # BLD AUTO: 0.01 X10(3) UL (ref 0–1)
IMM GRANULOCYTES NFR BLD: 0.2 %
INR BLD: 1.48 (ref 0.8–1.2)
LYMPHOCYTES # BLD AUTO: 0.8 X10(3) UL (ref 1–4)
LYMPHOCYTES NFR BLD AUTO: 13.5 %
MCH RBC QN AUTO: 25 PG (ref 26–34)
MCHC RBC AUTO-ENTMCNC: 30.3 G/DL (ref 31–37)
MCV RBC AUTO: 82.4 FL
MONOCYTES # BLD AUTO: 0.58 X10(3) UL (ref 0.1–1)
MONOCYTES NFR BLD AUTO: 9.8 %
NEUTROPHILS # BLD AUTO: 3.99 X10 (3) UL (ref 1.5–7.7)
NEUTROPHILS # BLD AUTO: 3.99 X10(3) UL (ref 1.5–7.7)
NEUTROPHILS NFR BLD AUTO: 67.3 %
OSMOLALITY SERPL CALC.SUM OF ELEC: 290 MOSM/KG (ref 275–295)
PLATELET # BLD AUTO: 191 10(3)UL (ref 150–450)
POTASSIUM SERPL-SCNC: 4.1 MMOL/L (ref 3.5–5.1)
PROTHROMBIN TIME: 18.1 SECONDS (ref 11.6–14.8)
RBC # BLD AUTO: 4.04 X10(6)UL
SODIUM SERPL-SCNC: 137 MMOL/L (ref 136–145)
WBC # BLD AUTO: 5.9 X10(3) UL (ref 4–11)

## 2022-01-02 PROCEDURE — 97530 THERAPEUTIC ACTIVITIES: CPT

## 2022-01-02 PROCEDURE — 80048 BASIC METABOLIC PNL TOTAL CA: CPT | Performed by: HOSPITALIST

## 2022-01-02 PROCEDURE — 85025 COMPLETE CBC W/AUTO DIFF WBC: CPT | Performed by: HOSPITALIST

## 2022-01-02 PROCEDURE — 85610 PROTHROMBIN TIME: CPT | Performed by: HOSPITALIST

## 2022-01-02 PROCEDURE — 97110 THERAPEUTIC EXERCISES: CPT

## 2022-01-02 PROCEDURE — 97116 GAIT TRAINING THERAPY: CPT

## 2022-01-02 NOTE — PHYSICAL THERAPY NOTE
PHYSICAL THERAPY TREATMENT NOTE - INPATIENT     Room Number: 405/405-A       Presenting Problem: Pt admitted to ER w/ generalized weakness, SOB, and confusion (CHF? to be on tele, receiving IV bumex)    Problem List  Principal Problem:    Bilateral lower l RECOMMENDATIONS  PT Discharge Recommendations: 24 hour care/supervision     PLAN  PT Treatment Plan: Bed mobility; Endurance; Patient education;Gait training    SUBJECTIVE  Pt reports being ready for PT RX    OBJECTIVE  Precautions: Bed/chair alarm    WEIGHT to demonstrate supine - sit EOB @ level: supervision      Goal #1   Current Status  Mod a   Goal #2 Patient is able to demonstrate transfers Sit to/from Stand at assistance level: supervision with walker - rolling      Goal #2  Current Status  Mod A with R

## 2022-01-02 NOTE — PROGRESS NOTES
[place] [facility] - [facility]    Progress Note    Tabitha Stevenson Patient Status:  Inpatient    1925 MRN [id number]   Location [facility] 4W/SW/SE Attending Joseline David MD   Hosp Day # 7 PCP No primary care provider on file.      Subjective: 01/01/2022     01/01/2022    K 4.4 01/01/2022    CL 98 01/01/2022    CO2 36.0 (H) 01/01/2022    GLU 75 01/01/2022    CA 8.8 01/01/2022    ALB 3.2 (L) 12/28/2021    ALKPHO 117 12/28/2021    BILT 0.9 12/28/2021    TP 6.6 12/28/2021    AST 27 12/28/2021

## 2022-01-02 NOTE — PROGRESS NOTES
Progress Note  name: Darlyn Delaneymo  Room: 65/92  Date of admission: 12/25/2021    Primary care provider:  Dr. Seth Sutherland: seen and examined. Cr improving. auth pending.       Reason for Admission: Weakness    History ofPresent Illness: Pa Laterality Date   • APPENDECTOMY  1935   • CARPAL TUNNEL RELEASE Bilateral    • CATARACT  march, 2006   • CHOLECYSTECTOMY  1970   • COLONOSCOPY  9/2004, 11/2012   • HIP REPLACEMENT SURGERY      bilateral   • IR IVC FILTER PLACEMENT     • KNEE REPLACEMENT S —   12/25/21 1800 118/70 57 20 95 % —   12/25/21 1715 138/68 52 20 96 % —   12/25/21 1630 114/55 51 20 95 % —       General: NAD  HEENT: NC/AT, MMM, OP clear with no exudates, PERRL.   Neck: Supple, No LAD  Heart: Normal s1/s2, no MRG  Chest: CTAB: NT/ND, s infection. TECHNIQUE:   Single view. FINDINGS:  CARDIAC/VASC: Heart not significantly enlarged. Mild tortuosity of the thoracic aorta. Pulmonary vascularity grossly within normal limits. MEDIAST/JOSHUA:   No visible mass or adenopathy.  LUNGS/PLEURA: [facility] emergency department patient was afebrile with heart rate 60s, respiratory 21, blood pressure 138/98, saturating 97% on room air.   Labs were significant for sodium 133, BUN 47, creatinine 1.77, proBNP 3547, INR 5.96, hemoglobin 10.6, platelet 118, chest x-

## 2022-01-03 VITALS
BODY MASS INDEX: 32.49 KG/M2 | HEART RATE: 91 BPM | TEMPERATURE: 98 F | OXYGEN SATURATION: 92 % | SYSTOLIC BLOOD PRESSURE: 159 MMHG | DIASTOLIC BLOOD PRESSURE: 90 MMHG | HEIGHT: 64 IN | WEIGHT: 190.31 LBS | RESPIRATION RATE: 16 BRPM

## 2022-01-03 LAB
ANION GAP SERPL CALC-SCNC: 3 MMOL/L (ref 0–18)
BUN BLD-MCNC: 31 MG/DL (ref 7–18)
BUN/CREAT SERPL: 25 (ref 10–20)
CALCIUM BLD-MCNC: 9.2 MG/DL (ref 8.5–10.1)
CHLORIDE SERPL-SCNC: 98 MMOL/L (ref 98–112)
CO2 SERPL-SCNC: 32 MMOL/L (ref 21–32)
CREAT BLD-MCNC: 1.24 MG/DL
GLUCOSE BLD-MCNC: 86 MG/DL (ref 70–99)
INR BLD: 1.55 (ref 0.8–1.2)
MAGNESIUM SERPL-MCNC: 2.5 MG/DL (ref 1.6–2.6)
OSMOLALITY SERPL CALC.SUM OF ELEC: 282 MOSM/KG (ref 275–295)
PHOSPHATE SERPL-MCNC: 2.4 MG/DL (ref 2.5–4.9)
POTASSIUM SERPL-SCNC: 4.2 MMOL/L (ref 3.5–5.1)
PROTHROMBIN TIME: 18.8 SECONDS (ref 11.6–14.8)
SODIUM SERPL-SCNC: 133 MMOL/L (ref 136–145)

## 2022-01-03 PROCEDURE — 97530 THERAPEUTIC ACTIVITIES: CPT

## 2022-01-03 PROCEDURE — 83735 ASSAY OF MAGNESIUM: CPT | Performed by: FAMILY MEDICINE

## 2022-01-03 PROCEDURE — 80048 BASIC METABOLIC PNL TOTAL CA: CPT | Performed by: FAMILY MEDICINE

## 2022-01-03 PROCEDURE — 97110 THERAPEUTIC EXERCISES: CPT

## 2022-01-03 PROCEDURE — 97116 GAIT TRAINING THERAPY: CPT

## 2022-01-03 PROCEDURE — 85610 PROTHROMBIN TIME: CPT | Performed by: HOSPITALIST

## 2022-01-03 PROCEDURE — 84100 ASSAY OF PHOSPHORUS: CPT | Performed by: FAMILY MEDICINE

## 2022-01-03 RX ORDER — WARFARIN SODIUM 2 MG/1
4 TABLET ORAL NIGHTLY
Status: DISCONTINUED | OUTPATIENT
Start: 2022-01-03 | End: 2022-01-03

## 2022-01-03 RX ORDER — WARFARIN SODIUM 4 MG/1
TABLET ORAL
Qty: 30 TABLET | Refills: 0 | Status: SHIPPED | OUTPATIENT
Start: 2022-01-03

## 2022-01-03 NOTE — PLAN OF CARE
Patient is very hard of hearing- white board used for communication. Monitoring vital signs- stable at this time. Receiving IV antibiotics per MD order. Patient is tolerating room air and denies shortness of breath or trouble breathing.  Tolerating general for Discharge     Problem: Patient/Family Goals  Goal: Patient/Family Long Term Goal  Description: Patient's Long Term Goal: to go back to [facility] eventually    Interventions:  - Monitor vitals  - Monitor labs  - Medications per MD order  - Follow MD order cognitive and physical deficits and behaviors that affect risk of falls.   - [place] fall precautions as indicated by assessment.  - Educate pt/family on patient safety including physical limitations  - Instruct pt to call for assistance with activity bas barriers and strategies to overcome with those who interact with patient  Outcome: Adequate for Discharge     Problem: METABOLIC/FLUID AND ELECTROLYTES - ADULT  Goal: Electrolytes maintained within normal limits  Description: INTERVENTIONS:  - Monitor labs

## 2022-01-03 NOTE — CM/SW NOTE
WIL followed up on DC planning. SW received confirmation of insurance authorization. WIL confirmed with RN that pt is medically ready for discharge today. WIL called and spoke with Zenaida Brito to arrange a time for discharge.  RN is aware of discharge time and l

## 2022-01-03 NOTE — PROGRESS NOTES
[place] [facility] - [facility]    Progress Note    Guillermo Swain Patient Status:  Inpatient    1925 MRN [id number]   Location [facility] 4W/SW/SE Attending Phu Quijano MD   Hosp Day # 8 PCP No primary care provider on file.      Subjective: 01/02/2022    HGB 10.1 (L) 01/02/2022    HCT 33.3 (L) 01/02/2022    .0 01/02/2022    CREATSERUM 1.28 (H) 01/02/2022    BUN 33 (H) 01/02/2022     01/02/2022    K 4.1 01/02/2022    CL 97 (L) 01/02/2022    CO2 34.0 (H) 01/02/2022    GLU 82 01/02/ decreased to 1.28  -Consider discharge planning.

## 2022-01-03 NOTE — PLAN OF CARE
Pt A/Ox4. Hard of hearing. Communicates with writing on a white board. Given tylenol for pain. Tolerating cardiac diet. Up with 2, turn pivot to chair. [place] intact. Call light within reach. Safety precautions in place.  Plan to discharge to [facility] rehab pain with activity and pre-medicate as appropriate  Outcome: Progressing     Problem: RISK FOR INFECTION - ADULT  Goal: Absence of fever/infection during anticipated neutropenic period  Description: INTERVENTIONS  - Monitor WBC  - Administer growth factors and participate in their care  Description: Interventions:  - Assess communication ability and preferred communication style  - Implement communication aides and strategies  - Use visual cues when possible  - Listen attentively, be patient, do not interrup

## 2022-01-03 NOTE — DISCHARGE SUMMARY
DISCHARGE SUMMARY  name: Doyle Montiel  Room: 21/23  Date of admission: 12/25/2021  DATE OF DISCHARGE: 01/03/2022    Primary care provider:  Dr. Chacon Rolling:  Mitch Flores.      Reason for Admission: Weakness    History ofPresent I Procedure Laterality Date   • APPENDECTOMY  1935   • CARPAL TUNNEL RELEASE Bilateral    • CATARACT  march, 2006   • CHOLECYSTECTOMY  1970   • COLONOSCOPY  9/2004, 11/2012   • HIP REPLACEMENT SURGERY      bilateral   • IR IVC FILTER PLACEMENT     • KNEE R 53 18 94 % —   12/25/21 1800 118/70 57 20 95 % —   12/25/21 1715 138/68 52 20 96 % —   12/25/21 1630 114/55 51 20 95 % —       General: NAD  HEENT: NC/AT, MMM, OP clear with no exudates, PERRL.   Neck: Supple, No LAD  Heart: Normal s1/s2, no MRG  Chest: CTA CARDIAC/VASC: Heart not significantly enlarged. Mild tortuosity of the thoracic aorta. Pulmonary vascularity grossly within normal limits. MEDIAST/JOSHUA:   No visible mass or adenopathy. LUNGS/PLEURA: Lungs are slightly hypoexpanded.   There is a trace rig 60s, respiratory 21, blood pressure 138/98, saturating 97% on room air.   Labs were significant for sodium 133, BUN 47, creatinine 1.77, proBNP 3547, INR 5.96, hemoglobin 10.6, platelet 697, chest x-ray showed mildly coarsened interstitial markings and patc

## 2022-01-03 NOTE — PHYSICAL THERAPY NOTE
PHYSICAL THERAPY TREATMENT NOTE - INPATIENT     Room Number: 405/405-A       Presenting Problem: Pt admitted to ER w/ generalized weakness, SOB, and confusion (CHF? to be on tele, receiving IV bumex)    Problem List  Principal Problem:    Bilateral lower l session. DISCHARGE RECOMMENDATIONS  PT Discharge Recommendations: 24 hour care/supervision     PLAN  PT Treatment Plan: Bed mobility; Endurance; Patient education;Gait training    SUBJECTIVE  Pt reports being ready for PT RX    OBJECTIVE  Precautions: Bed Goal #1 Patient is able to demonstrate supine - sit EOB @ level: supervision      Goal #1   Current Status  Mod a   Goal #2 Patient is able to demonstrate transfers Sit to/from Stand at assistance level: supervision with walker - rolling      Goal #2  Cu

## 2022-01-03 NOTE — PROGRESS NOTES
[facility]    Nephrology Progress Note    Ilsa Edward Patient Status:  Inpatient    1925 MRN [id number]   Location [facility] 4W/SW/SE Attending Jimbo Isabel MD   Hosp Day # 7 PCP No primary care provider on file.      Gannon diuretics.     4.  Anemia. iron def- give iv iron     5.  Renal osteodystrophy: ipth 111. phosp acceptable.     6.  Hypertension.  Target goal less than 140/90 mmHg DC blood pressure medication to allow renal perfusion and for diuresis.    7 A. Fib     8.

## 2022-01-03 NOTE — CM/SW NOTE
Patient chart reviewed for discharge: Medication Reconciliation completed, Specialist/PCP follow up listed, and disease specific Instructions/Education included in After Visit Summary. DC RN spoke to patient's RN to verify all consultants have signed off.

## 2022-01-04 NOTE — PAYOR COMM NOTE
--------------  WE ARE STILL AWAITING YOUR DETERMINATION ON THIS INPT ADMISSION.     PLEASE FAX INPT DAYS AUTHORIZED ASAP -[phone]    [facility] YOU!    DISCHARGE REVIEW    Payor: Lorita Primrose [place] [facility]  Subscriber #:  [id number]  Authorization Number: [id number]

## 2022-01-18 NOTE — CDS QUERY
Heart Failure  CLINICAL DOCUMENTATION CLARIFICATION FORM  How To Answer This Query:  1)  Click \"Edit Button\" on the toolbar. 2)  Type an \"X\" in the bracket for the diagnosis that applies.   (You may also add additional clinical details as you feel nece velocity (S): 3.13m/sec. Mean gradient (S): 17mm Hg. Peak     gradient (S): 39mm Hg. Valve area (VTI): 0.63cm^2. Valve area     (Vmax): 0.57cm^2. 3. Mitral valve: Moderate regurgitation. 4. Left atrium: The atrium was severely dilated.   5. Right ventricl

## 2022-01-28 ENCOUNTER — TELEPHONE (OUTPATIENT)
Dept: INTERNAL MEDICINE CLINIC | Facility: CLINIC | Age: 87
End: 2022-01-28

## 2022-01-28 NOTE — TELEPHONE ENCOUNTER
Discharge summary: From [facility].  note transcribed by Dr. Mehdi Banuelos    Date seen: 1/17/2022    Subjective: Patient seen and examined for atrial fibrillation, INR monitoring, CHF, cellulitis, pneumonitis, discharge.   Patient seems stable, doin discharge planning  Acute on chronic diastolic CHF: Continue current diuresis, volume status, involve in-house cardiology consultation  Bilateral lower extremity cellulitis: Continue and complete oral antibiotics  Acute kidney injury: Stable continue carlose once daily    famotidine 20 mg tablet  SIG: give 1 tablet ( 20 mg ) by oral route once daily    docusate sodium 100 mg capsule  SIG: give 1 capsule ( 100 mg) by oral route once daily    Bumex 1 mg tablet  SIG: give 1 tablet (1 mg) by oral route 2 times per

## 2022-04-19 NOTE — PROGRESS NOTES
[facility]    Nephrology Progress Note    Viktoriya Reno Patient Status:  Inpatient    1925 MRN [id number]   Location [facility] 4W/SW/SE Attending Cori Adan MD   Hosp Day # 6 PCP No primary care provider on file.      [place]annon g/g     3.  CHF/hypervolemia: IV diuretics.     4.  Anemia. iron def- give iv iron     5.  Renal osteodystrophy: ipth 111.  phosp acceptable.     6.  Hypertension.  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

## (undated) NOTE — IP AVS SNAPSHOT
[facility]            (For Outpatient Use Only) Initial Admit Date: 2/10/2018   Inpt/Obs Admit Date: Inpt: 2/10/18 / Obs: N/A   Discharge Date:    Annmarie Francisco:  [de-identified]   MRN: [de-identified]   CSN: [id number]        ENCOUNTER  Patient Class Subscriber ID:  Pt Rel to Subscriber:    Hospital Account Financial Class: Medicare    February 13, 2018

## (undated) NOTE — IP AVS SNAPSHOT
Patient Demographics     Address  [address] [place] [address] Phone  [phone] [facility])      Emergency Contact(s)     Name Relation Home Work Jovan flores Son [phone]    Gumaro Yap [phone]        Allergies Next dose due:  2/13/18 evening      Take 100 mg by mouth 2 (two) times daily. Nadia Arzate NP         furosemide 20 MG Tabs  Commonly known as:  LASIX  Next dose due:  2/14/18 morning      Take 1 tablet (20 mg total) by mouth daily.    Nadia Arzate NP TraMADol HCl 50 MG Tabs  Commonly known as:  ULTRAM  Next dose due:  Anytime as needed      Take 1 tablet (50 mg total) by mouth every 6 (six) hours as needed for Pain.  Takes rarely   Rehana Bennett NP         valsartan 160 MG Tabs  Commonly known as:  Sherrie Hyde Recent Vital Signs    Flowsheet Row Most Recent Value   Vitals  127/82 Filed at 02/13/2018 0821   Pulse  75 Filed at 02/13/2018 0821   Resp  16 Filed at 02/13/2018 0821   Temp  97.7 °F (36.5 °C) Filed at 02/13/2018 0821   SpO2  96 % Filed at 02/13/2018 082 receiving anticoagulant therapy may be seen in  factor deficiency, vitamin K deficiency, liver  disease, etc. Clinical correlation is recommended.        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), previous PE (s/p IVC filter), stable hypothryoid and stable HTN comes to ER after acute onset of right elbow pain and Smokeless tobacco: Never Used                      Alcohol use: No              Allergies/Medications:    Allergies: No Known Allergies    Prescriptions Prior to Admission:  Metoprolol Succinate ER (TOPROL XL) 25 MG Oral Tablet 24 Hr Take 1 tablet (25 mg to Review of Systems:   Pertinent positives and negatives noted above in HPI, otherwise full 10 point ROS performed and negative     Physical Exam:   Vital Signs:  Blood pressure 151/83, pulse 98, temperature 98.2 °F (36.8 °C), temperature source Temporal, re Xr Elbow, Complete (min 3 Views), Right (cpt=73080)    Result Date: 2/10/2018  CONCLUSION:  1. No evidence of acute fracture or dislocation. No evidence of erosions. 2. Chronic fracture deformity of the proximal right radius.  3. Severe DJD of the right elb Consults signed by Martin Chavez MD at 2/10/2018 12:08 PM     Author: Martin Chavez MD Service:  Orthopedics Author Type:  Physician    Filed:  2/10/2018 12:08 PM Date of Service:  2/10/2018 12:05 PM Status:  Signed    :   Ana M Thompson (25 mg total) by mouth 2 (two) times daily. Disp: 180 tablet Rfl: 1   Alendronate Sodium (FOSAMAX) 70 MG Oral Tab Take 1 tablet (70 mg total) by mouth every 7 days. Take with full glass of water. Remain upright for 30 min. May eat after 30 min.  (Patient t Coenzyme Q10 (CO Q-10 OR) Take 1 tablet by mouth daily.    Disp:  Rfl:        No Known Allergies    Family History   Problem Relation Age of Onset   • Heart Disorder Father    • Other [OTHER] Father      Kidney disease   • Heart Disorder Mother    • Diabete supratherapeutic Coumadin, range of motion and activities as tolerated. [VT.1]      Electronically signed by Erika Landaverde MD on 2/10/2018 12:08 PM   Attribution Key    VT. 1 - Erika Landaverde MD on 2/10/2018 12:05 PM  VT. 2 - Erika Landaverde, training;Strengthening;Transfer training    SUBJECTIVE  \"I'm so tired. My back hurts\"     OBJECTIVE  Precautions:  Other (Comment) (RUE ROM and activity as tolerated)    WEIGHT BEARING RESTRICTION  Weight Bearing Restriction: None                PAIN ASSE Patient End of Session: In bed;Needs met;Call light within reach;RN aware of session/findings; All patient questions and concerns addressed    CURRENT GOALS   CURRENT GOALS     Goals to be met by: 2/26/18   Patient Goal Patient's self-stated goal is: Go to Presenting Problem: p/w R elbow pain, dx with hemarthrosis; negative for fx[AO. 2]  Reason for Therapy: Mobility Dysfunction and Discharge Planning    PHYSICAL THERAPY ASSESSMENT     Patient is a[AO.3 80year old[AO.2] female admitted 2/10/2018 for R elbow PHYSICAL THERAPY MEDICAL/SOCIAL HISTORY     Problem List[AO.1]  Principal Problem:    Hemarthrosis  Active Problems:    Supratherapeutic INR    Cellulitis of right upper extremity[AO.2]      Past Medical History[AO.1]  Past Medical History:   Diagnosis Lb · Overall Cognitive Status:  WFL - within functional limits    RANGE OF MOTION AND STRENGTH ASSESSMENT    Lower extremity AROM is within functional limits    Lower extremity strength is within functional limits except for the following:   Left Hip flexion Patient End of Session: Up in chair;Needs met;Call light within reach;RN aware of session/findings; All patient questions and concerns addressed[AO.2]    CURRENT GOALS    Goals to be met by: 2/26/18   Patient Goal Patient's self-stated goal is: Go to rehab Presenting Problem: supratherapeutic INR, RUE cellulitis[EW.3]    Physician Order: IP Consult to Occupational Therapy  Reason for Therapy: ADL/IADL Dysfunction and Discharge Planning    OCCUPATIONAL THERAPY ASSESSMENT     Patient is a[EW.3 80year old[EW. prior level of function.[EW.1] Upon D/C, patient would benefit from continued OT in a rehab setting to return to PLOF and living alone at home safely.[EW.2]     DISCHARGE RECOMMENDATIONS[EW.1]  OT Discharge Recommendations: Sub-acute rehabilitation  OT Dev Stairs in Home:[EW.1] 2 inside, 2 outside[EW.2]  Use of Assistive Device(s):[EW.1] RW[EW.2]    Prior Level of [place]:[EW.1] Patient reports being mod I w/ ADLs, IADLs, including medication management and light cooking, functional mobility, and drivin -   Putting on and taking off regular upper body clothing?: A Little  -   Taking care of personal grooming such as brushing teeth?: A Little  -   Eating meals?: None[EW.3]    AM-PAC Score:[EW.1]  Score: 18  Approx Degree of Impairment: 46.65%  Standardized Video Swallow Study Notes     No notes of this type exist for this encounter. SLP Notes     No notes of this type exist for this encounter.             Immunizations     Name Date      DT 07/20/07     INFLUENZA 11/11/16     INFLUENZA

## (undated) NOTE — LETTER
[address] [place] [address]  Authorization for Invasive Procedures  1.  I hereby authorize Dr. Ubaldo Mckinnon , my physician and whomever may be designated as the doctor's assistant, to perform the following operation and/or procedure:  Ultrasound 4. Should the need arise during my operation or immediate post-operative period; I also consent to the administration of blood and/or blood products.  Further, I understand that despite careful testing and screening of blood and blood products, I may still 9. Patients having a sterilization procedure: I understand that if the procedure is successful the results will be permanent and it will therefore be impossible for me to inseminate, conceive or bear children.  I also understand that the procedure is intend

## (undated) NOTE — IP AVS SNAPSHOT
[facility]            (For Outpatient Use Only) Initial Admit Date: 12/27/2020   Inpt/Obs Admit Date: Inpt: 12/27/20 / Obs: N/A   Discharge Date:    Kenna Engel:  [de-identified]   MRN: [de-identified]   CSN: [id number]   CEID: [id number]        E Payor:  Plan:    Group Number:  Insurance Type:    Subscriber Name:  Subscriber :    Subscriber ID:  Pt Rel to Subscriber:    Hospital Account Financial Class: Medicare Advantage    2021

## (undated) NOTE — LETTER
[id number] [facility]     I agree to have a Peripherally Inserted Central Catheter (PICC) placed in my arm.    1. The PICC insertion procedure, care, maintenance, risks, benefits, and complications have been explained to me b 7. The person performing this procedure has discussed the potential benefits, risks, and side effects of the PICC; the likelihood of achieving goals; and potential problems that might occur during recuperation.  They also discussed reasonable alternatives t

## (undated) NOTE — IP AVS SNAPSHOT
Patient Demographics     Address  [address] [facility] [address] Phone  [phone] (Work)  [phone] (Mobile) *Preferred*      Emergency Contact(s)     Name Relation Home Work Mobile    Cecilio Leslie [phone] MG/5ML Susp  Commonly known as: MYLANTA DS      Take 10 mL by mouth every 6 (six) hours as needed for Indigestion. bumetanide 1 MG Tabs  Commonly known as: BUMEX  Next dose due: Take tonight       Take 1 mg by mouth 2 (two) times daily. [phone]  [address] [address] [address] [facility] [address]    Phone: [phone]   metoprolol tartrate 25 MG Tabs  warfarin 4 MG Tabs           [id number] - MAR ACTION REPORT  (last 24 hrs)    ** SITE UNKNOWN **     Order ID Medication Name Action Time Action Re Jacki Velazquez MD  01/03/22 1018 Resulting lab: [facility] ([facility])    Specimen Information    Type Source Collected On   Blood — 01/03/22 1112          Components    Component Value Reference Range Flag Lab   Glucose 86 70 - 99 mg/dL — recommended. INR 1.55 0.80 - 1.20 H [facility])   Comment:        Only the INR (not the PT value) should be utilized for   the monitoring of oral anticoagulant therapy.      Recommended therapeutic ranges for anticoagulant therapy are   as foll SARS-CoV-2 PCR: Not Detected     Metapneumovirus PCR: Not Detected     Rhinovirus/Entero PCR: Not Detected     Influenza A PCR: Not Detected     Influenza B PCR: Not Detected     Parainfluenza 1 PCR: Not Detected     Parainfluenza 2 PCR Not Detected     P 43/43  Date of admission: 12/25/2021    Primary care provider:  Dr. Oz Sherman:  Reason for Admission: Weakness    History ofPresent Illness: Patient is a 80year old female with PMH of atrial fibrillation on Coumadin, CVA, hypertension, 2006   • CHOLECYSTECTOMY  1970   • COLONOSCOPY  9/2004, 11/2012   • HIP REPLACEMENT SURGERY      bilateral   • IR IVC FILTER PLACEMENT     • KNEE REPLACEMENT SURGERY  12/1991    R knee, total   • KNEE REPLACEMENT SURGERY  6/1990    L knee, total   • OTHER 51 20 95 % —       General: NAD  HEENT: NC/AT, MMM, OP clear with no exudates, PERRL. Neck: Supple, No LAD  Heart: Normal s1/s2, no MRG  Chest: CTAB: NT/ND, soft, + Bs, no HSM  Ext: no peripheral edema. Neuro: CN II through XII grossly intact.   No foca over the central chest, could be on the anterior or posterior chest wall.          CONCLUSION:   Mildly coarsened interstitial markings and patchy scattered ground-glass subtle opacities, probably related to patient's known history of recently diagnosed res antibiotic. Will consult infectious disease. 4. Acute kidney injury: Likely dehydration. Nephrology consulted. 5. Atrial fibrillation: Rate controlled. Hold Coumadin due to supratherapeutic INR. 6. History of CVA: Continue supportive care  7.  Hyperte march, 2006   • CHOLECYSTECTOMY  1970   • COLONOSCOPY  9/2004, 11/2012   • HIP REPLACEMENT SURGERY      bilateral   • IR IVC FILTER PLACEMENT     • KNEE REPLACEMENT SURGERY  12/1991    R knee, total   • KNEE REPLACEMENT SURGERY  6/1990    L knee, total   • hard of hearing. Vital signs: Blood pressure 122/61, pulse 77, temperature 99 °F (37.2 °C), temperature source Oral, resp. rate 18, height 5' 4\" (1.626 m), weight 190 lb 4.8 oz (86.3 kg), last menstrual period 01/01/1987, SpO2 93 %.   HEENT: Moist mucous heart failure type ([address]     Viral URI with cough     Supratherapeutic INR      Presenting with increased shortness of breath. Respiratory symptoms for about 2 to 3 days. Currently on room air. Patient is very hard of hearing.   Currently getting echocar ------------------------------------------------------------------- Mali[address]e [address] 12/30/1925 H: [id number]                                E: [id number] Study date: Dec 27 2021 1:40PM               [facility]: 12 performed from the parasternal, apical, and subcostal acoustic windows. Study completion:  The patient tolerated the procedure well. There were no complications. Transthoracic echocardiography.  M-mode, complete 2D, complete spectral Doppler, and color Dop restricted. Doppler: There was no evidence for stenosis. Moderate-severe regurgitation. Pulmonary artery:   The right ventricular systolic pressure was increased consistent with moderate pulmonary hypertension.  Right atrium:  The atrium was severely dil 0.6 - 0.9   LVOT                                    Value          Reference  LVOT ID, S                              1.8   cm       -----------  Stroke volume (SV), LVOT DP             41    ml       -----------  Stroke index (SV/bsa), LVOT DP          2 gradient           52    mm Hg    -----------   Right atrium                            Value          Reference  RA volume, ES, A/L                      129   ml       -----------  RA volume/bsa, ES, A/L          (H)     64.1  ml/m^2   9.0 - 33.0   System function was mildly reduced. The estimated    ejection fraction was 50%, by biplane method of disks. Wall    motion was normal; there were no regional wall motion    abnormalities.  Doppler parameters are consistent with a    reversible restrictive pattern, was mildly reduced. The estimated ejection fraction was 50%, by biplane method of disks. Wall motion was normal; there were no regional wall motion abnormalities.  Doppler parameters are consistent with a reversible restrictive pattern, indicative of decrea pressure. ------------------------------------------------------------------- Measurements  Left ventricle                          Value          Reference  LV ID, ED, PLAX                         4.4   cm       3.8 - 5.2  LV ID, ES, PLAX 65.6  cm       -----------  Aortic mean gradient, S                 17    mm Hg    -----------  Aortic peak gradient, S                 39    mm Hg    -----------  Aortic valve area, VTI                  0.63  cm^2     -----------  Velocity rati pressure, S, DP                      67    mm Hg    -----------  RV s', lateral, S                       0.1   m/sec    0.06 - 0.13   Pulmonic valve                          Value          Reference  Pulmonic regurg gradient, ED            5     mm Hg    - educated on deep breathing,relaxation as well as energy conservation technique. Mod a for bed mobility and transfer;extra time provided to complete task. EOB sitting balance activity  X 8 min a  with emphasis on core stabilization. Pt amb 20 ft with RW and mo of the bed?: A Lot   How much help from another person does the patient currently need. ..    Help from Another: Moving to and from a bed to a chair (including a wheelchair)?: A Lot   Help from Another: Need to walk in hospital room?: A Lot   Help from [facility] INPATIENT     Room Number: 405/405-A       Presenting Problem: Pt admitted to ER w/ generalized weakness, SOB, and confusion (CHF? to be on tele, receiving IV bumex)    Problem List  Principal Problem:    Bilateral lower leg cellulitis  Active Problems: Recommendations: 24 hour care/supervision     PLAN  PT Treatment Plan: Bed mobility; Endurance; Patient education;Gait training    SUBJECTIVE  Pt reports being ready for PT RX    OBJECTIVE  Precautions: Bed/chair alarm    WEIGHT BEARING RESTRICTION  Weight B @ level: supervision      Goal #1   Current Status  Mod a   Goal #2 Patient is able to demonstrate transfers Sit to/from Stand at assistance level: supervision with walker - rolling      Goal #2  Current Status  Mod A with RW from chair.    Goal #3 Patient stating receiving assist for dressing and cooking at facility, discussed with pt use of LH AE to assist with dressing, pt stating has all LH AE at h ome and is aware of use.    Pt tolerated ambulating 4 ft in room with RW and with min assist, and tolerated (G-Code): CK    FUNCTIONAL TRANSFER ASSESSMENT  Supine to Sit : Moderate assistance  Sit to Stand:  Moderate assistance  Toilet Transfer: NT  Shower Transfer: NT  Chair Transfer: mod assist     Bedroom Mobility: pt tolerated sitting EOB unsupported,, standi 01/14/16     Pneumovax 23 12/02/14     Pneumovax 23 11/14/08     TDAP 03/28/15       Multidisciplinary Problems     Active Goals        Problem: Patient/Family Goals    Goal Priority Disciplines Outcome Interventions   Patient/Family Long Term Goal     Int

## (undated) NOTE — LETTER
04/13/20        [address] [id number]      Dear Meaghan Almaraz,    [address] [facility] records indicate that you have outstanding lab work and or testing that was ordered for you and has not yet been completed:  Orders Placed This Encounter      US

## (undated) NOTE — IP AVS SNAPSHOT
[facility]            (For Outpatient Use Only) Initial Admit Date: 12/25/2021   Inpt/Obs Admit Date: Inpt: 12/26/21 / Obs: N/A   Discharge Date:    Brie Simmons:  [de-identified]   MRN: [de-identified]   CSN: [id number]   CEID: [id number]        E ID:  Pt Rel to Subscriber:    Hospital Account Financial Class: Medicare Advantage    January 3, 2022

## (undated) NOTE — LETTER
VACCINE ADMINISTRATION RECORD  PARENT / GUARDIAN APPROVAL  Date: 2021  Vaccine administered to: Tiff Willams     : 1925    MRN: [id number]    A copy of the appropriate Centers for Disease Control and Prevention Vaccine Information statement

## (undated) NOTE — IP AVS SNAPSHOT
Patient Demographics     Address  [facility] Phone  [phone] [facility])  [phone] (Mobile) *Preferred*      Emergency Contact(s)     Name Relation Home Work Jovan flores Son [phone]    [facility] Instructions Authorizing Provider Morning Afternoon Evening As Needed   acetaminophen 500 MG Tabs  Commonly known as: Acetaminophen Extra Strength  Next dose due: Tonight       Take 2 tablets (1,000 mg total) by mouth nightly.    Clement Palma MD Take 1 tablet (2 mg total) by mouth nightly.    Clemente Chou MD               Where to Get Your Medications      Please  your prescriptions at the location directed by your doctor or nurse    Bring a paper prescription for each of these MAIN LINE [facility] Glucose 83 70 - 99 mg/dL — [facility])   Sodium 135 136 - 145 mmol/L L [facility])   Potassium 4.4 3.5 - 5.1 mmol/L — [facility])   Chloride 105 98 - 112 mmol/L — [facility] ([facility])   CO2 27.0 21.0 - 32.0 mmol/L [facility]) receiving anticoagulant therapy may be seen in  factor deficiency, vitamin K deficiency, liver  disease, etc. Clinical correlation is recommended.        INR 2.45 0.90 - 1.20 H [facility])   Comment:        Only the INR (not the Protime value) should Order Status: Completed Lab Status: Final result Updated: 01/01/21 2300    Specimen: Blood,peripheral      Blood Culture Result No Growth 5 Days    Aerobic Bacterial Culture Once [[id number]] Collected: 12/27/20 2121    Order Status: Completed Lab Status: face   • Bowel obstruction ([facility] [address] 1947    Adhesions   • Cerebellar stroke, acute ([facility] 2002   • Essential hypertension    • Hemiplegia affecting left nondominant side (HCC)    • Hyperlipidemia    • Post-menopausal     LMP 1987   • Pulmonary embolism ([facility] •  METOPROLOL TARTRATE 25 MG Oral Tab, TAKE 1 TABLET BY MOUTH EVERY 12 HOURS, Disp: 60 tablet, Rfl: 2    •  POLYETHYLENE GLYCOL 3350 Oral Powder, MIX 17 GRAMS WITH LIQUID ONCE DAILY AS NEEDED FOR CONSTIPATION, Disp: 510 g, Rfl: 1    •  acetaminophen (ACETA * 133*   K 4.7 4.4   CL 97* 101   CO2 28.0 27.0   BUN 29* 25*   CREATSERUM 1.18* 1.20*   GLU 98 86   CA 9.3 9.0       ASSESSMENT / PLAN:   Mrs. Urbano Beckham is a 80year old female with a history of Atrial fibrillation on coumadin, PE, Stroke, hypothyroidi Hosp Day # 2 PCP Jus Lennon MD     Date of Admission:  12/27/2020  Date of Consult:[MB.1]  12/28/2020[MB. 2]  Reason for Consultation:[MB.1]   Right hip abscess[MB. 2]    History of Present Illness:   Patient is a 80year old female who was admitted to • Cancer Brother         Leukemia   • Heart Disorder Brother        Social History  Social History    Tobacco Use      Smoking status: Never Smoker      Smokeless tobacco: Never Used    Alcohol use: No    Drug use: No          Current Medications:    •  ce •  Warfarin Sodium 2 MG Oral Tab, Take 1 tablet (2 mg total) by mouth nightly. (Patient taking differently: Take 3 mg by mouth nightly.  )    •  TraMADol HCl 50 MG Oral Tab, Take 1 tablet (50 mg total) by mouth every 6 (six) hours as needed for Pain.  Takes MUSCULOSKELETAL: Full range of motion noted. Motor strength is 5 out of 5 all extremities bilaterally. SKIN:  no rashes and no jaundice[MB. 1]     Right hip with old, well healed THR scar     10cm area of induration and tenderness, centered under scar CONCLUSION:  1. Post bilateral hip arthroplasties. The left hip arthroplasty is only seen on  imaging. Right hip arthroplasty is in gross anatomic position.  2.  Fluid collection lateral to the right hip in the continues since subcutaneous fat, kathy MB.2 - Ivanna Richey MD on 12/29/2020 10:21 PM                        Discharge Summary - D/C Summary      Discharge Summary signed by Chi Rausch MD at 1/5/2021  5:25 PM  Version 1 of 1    Author: Chi Rausch MD Service: Hospitalist Author Type: Physician Mrs. Arnoldo Ochoa is a 80year old female with a history of Atrial fibrillation on coumadin, PE, Stroke, hypothyroidism, Hypertension, who presents for evaluation of right buttock/lateral hip abscess. She has a history of prior total hip replacement.  Apparently -plans for rehab at [facility]   -Gen surg suggested MRI of hip, unable to get due to nerve stimulator, plan to continue IV antibiotic therapy, follow-up with general surgery in 1 week, consider outpatient CT of hip at that time.  -discussed with son that Commonly known as: SYNTHROID     metoprolol Tartrate 25 MG Tabs  Commonly known as: LOPRESSOR  TAKE 1 TABLET BY MOUTH EVERY 12 HOURS     MULTIVITAMIN OR     Ondansetron HCl 4 mg tablet  Commonly known as: ZOFRAN     Polyethylene Glycol 3350 17 GM/SCOOP Pow Other Discharge Instructions:       Continue drain instructions per IR and General surgery. Please check INR in 1-2 days    Please follow up with Infectious disease, Gen surgery, IR and PCP in 1-2 weeks    Continue antibiotics per ID.             Total T Pt was received up in the chair, agreeable for therapy. Pt hopeful to start moving more and reports her pain is minimal. Pt was able to perform sit to stand with Min A and ambulated X5', X4' with a RW and Min/Mod A.  Pt ambulates with very slow filiberto, emanuel How much help from another person does the patient currently need. ..   -   Moving to and from a bed to a chair (including a wheelchair)?: A Little   -   Need to walk in hospital room?: A Lot   -   Climbing 3-5 steps with a railing?: A Lot     AM-PAC Score: Filed: 1/4/2021 11:57 AM Date of Service: 1/4/2021 11:45 AM Status: Signed    : Alberto Jacobo PT (Physical Therapist)       PHYSICAL THERAPY TREATMENT NOTE - INPATIENT     Room Number: 456/456-A[MD.1]       Presenting Problem: weakness and pain[MD.2 Dynamic Sitting: Poor +           Static Standing: Poor +  Dynamic Standing: Poor[MD.2]    ACTIVITY TOLERANCE                         O2 WALK                  AM-PAC '6-Clicks' INPATIENT SHORT FORM - BASIC MOBILITY  How much difficulty does the patient cur Goal #5 Patient to demonstrate independence with home activity/exercise instructions provided to patient in preparation for discharge.    Goal #5   Current Status NT   Goal #6    Goal #6  Current Status[MD.1]         Attribution Huerta    MD.1 - Geovani Barahona, OT Discharge Recommendations: Sub-acute rehabilitation  OT Device Recommendations: TBD     PLAN  OT Treatment Plan: Patient/Family education;Patient/Family training; Compensatory technique education; Endurance training;Functional transfer training;Energy con No notes of this type exist for this encounter.      Immunizations     Name Date      DT 07/20/07     INFLUENZA 01/05/21     INFLUENZA 10/25/18     INFLUENZA 11/11/16     INFLUENZA 11/14/15     INFLUENZA 12/02/14     INFLUENZA 10/12/13     INFLUENZA 10/12/1